# Patient Record
Sex: FEMALE | Race: WHITE | NOT HISPANIC OR LATINO | Employment: OTHER | URBAN - METROPOLITAN AREA
[De-identification: names, ages, dates, MRNs, and addresses within clinical notes are randomized per-mention and may not be internally consistent; named-entity substitution may affect disease eponyms.]

---

## 2017-01-14 DIAGNOSIS — E78.5 HYPERLIPIDEMIA LDL GOAL <160: Primary | ICD-10-CM

## 2017-06-13 ENCOUNTER — OFFICE VISIT (OUTPATIENT)
Dept: INTERNAL MEDICINE | Facility: CLINIC | Age: 62
End: 2017-06-13
Payer: COMMERCIAL

## 2017-06-13 VITALS
DIASTOLIC BLOOD PRESSURE: 68 MMHG | TEMPERATURE: 97.5 F | HEIGHT: 65 IN | HEART RATE: 84 BPM | OXYGEN SATURATION: 100 % | BODY MASS INDEX: 26.99 KG/M2 | WEIGHT: 162 LBS | SYSTOLIC BLOOD PRESSURE: 130 MMHG

## 2017-06-13 DIAGNOSIS — Z00.00 ENCOUNTER FOR ROUTINE ADULT HEALTH EXAMINATION WITHOUT ABNORMAL FINDINGS: Primary | ICD-10-CM

## 2017-06-13 DIAGNOSIS — Z12.39 SCREENING BREAST EXAMINATION: ICD-10-CM

## 2017-06-13 DIAGNOSIS — E78.5 HYPERLIPIDEMIA LDL GOAL <160: ICD-10-CM

## 2017-06-13 DIAGNOSIS — E03.9 HYPOTHYROIDISM, UNSPECIFIED TYPE: ICD-10-CM

## 2017-06-13 LAB
ALBUMIN SERPL-MCNC: 4.1 G/DL (ref 3.4–5)
ALP SERPL-CCNC: 58 U/L (ref 40–150)
ALT SERPL W P-5'-P-CCNC: 26 U/L (ref 0–50)
ANION GAP SERPL CALCULATED.3IONS-SCNC: 4 MMOL/L (ref 3–14)
AST SERPL W P-5'-P-CCNC: 31 U/L (ref 0–45)
BILIRUB SERPL-MCNC: 0.6 MG/DL (ref 0.2–1.3)
BUN SERPL-MCNC: 19 MG/DL (ref 7–30)
CALCIUM SERPL-MCNC: 9.4 MG/DL (ref 8.5–10.1)
CHLORIDE SERPL-SCNC: 107 MMOL/L (ref 94–109)
CHOLEST SERPL-MCNC: 268 MG/DL
CO2 SERPL-SCNC: 28 MMOL/L (ref 20–32)
CREAT SERPL-MCNC: 1.2 MG/DL (ref 0.52–1.04)
ERYTHROCYTE [DISTWIDTH] IN BLOOD BY AUTOMATED COUNT: 12.9 % (ref 10–15)
GFR SERPL CREATININE-BSD FRML MDRD: 45 ML/MIN/1.7M2
GLUCOSE SERPL-MCNC: 90 MG/DL (ref 70–99)
HCT VFR BLD AUTO: 44.7 % (ref 35–47)
HDLC SERPL-MCNC: 81 MG/DL
HGB BLD-MCNC: 14.5 G/DL (ref 11.7–15.7)
LDLC SERPL CALC-MCNC: 170 MG/DL
MCH RBC QN AUTO: 30.5 PG (ref 26.5–33)
MCHC RBC AUTO-ENTMCNC: 32.4 G/DL (ref 31.5–36.5)
MCV RBC AUTO: 94 FL (ref 78–100)
NONHDLC SERPL-MCNC: 187 MG/DL
PLATELET # BLD AUTO: 277 10E9/L (ref 150–450)
POTASSIUM SERPL-SCNC: 4.2 MMOL/L (ref 3.4–5.3)
PROT SERPL-MCNC: 7.1 G/DL (ref 6.8–8.8)
RBC # BLD AUTO: 4.76 10E12/L (ref 3.8–5.2)
SODIUM SERPL-SCNC: 139 MMOL/L (ref 133–144)
TRIGL SERPL-MCNC: 85 MG/DL
TSH SERPL DL<=0.005 MIU/L-ACNC: 0.97 MU/L (ref 0.4–4)
WBC # BLD AUTO: 4.7 10E9/L (ref 4–11)

## 2017-06-13 PROCEDURE — 80053 COMPREHEN METABOLIC PANEL: CPT | Performed by: INTERNAL MEDICINE

## 2017-06-13 PROCEDURE — 85027 COMPLETE CBC AUTOMATED: CPT | Performed by: INTERNAL MEDICINE

## 2017-06-13 PROCEDURE — 99396 PREV VISIT EST AGE 40-64: CPT | Performed by: INTERNAL MEDICINE

## 2017-06-13 PROCEDURE — 84443 ASSAY THYROID STIM HORMONE: CPT | Performed by: INTERNAL MEDICINE

## 2017-06-13 PROCEDURE — 36415 COLL VENOUS BLD VENIPUNCTURE: CPT | Performed by: INTERNAL MEDICINE

## 2017-06-13 PROCEDURE — 80061 LIPID PANEL: CPT | Performed by: INTERNAL MEDICINE

## 2017-06-13 RX ORDER — LEVOTHYROXINE SODIUM 75 UG/1
75 TABLET ORAL DAILY
Qty: 90 TABLET | Refills: 3 | Status: CANCELLED | OUTPATIENT
Start: 2017-06-13

## 2017-06-13 NOTE — MR AVS SNAPSHOT
After Visit Summary   6/13/2017    Kiki Ibrahim    MRN: [id number]           Patient Information     Date Of Birth          1955        Visit Information        Provider Department      6/13/2017 8:00 AM Roberto Montoya MD [facility]        Today's Diagnoses     Encounter for routine adult health examination without abnormal findings    -  1    Hypothyroidism, unspecified type        Hyperlipidemia LDL goal <160        Screening breast examination          Care Instructions      Preventive Health Recommendations  Female Ages 50 - 64    Yearly exam: See your health care provider every year in order to  o Review health changes.   o Discuss preventive care.    o Review your medicines if your doctor has prescribed any.      Get a Pap test every three years (unless you have an abnormal result and your provider advises testing more often).    If you get Pap tests with HPV test, you only need to test every 5 years, unless you have an abnormal result.     You do not need a Pap test if your uterus was removed (hysterectomy) and you have not had cancer.    You should be tested each year for STDs (sexually transmitted diseases) if you're at risk.     Have a mammogram every 1 to 2 years.    Have a colonoscopy at age 50, or have a yearly FIT test (stool test). These exams screen for colon cancer.      Have a cholesterol test every 5 years, or more often if advised.    Have a diabetes test (fasting glucose) every three years. If you are at risk for diabetes, you should have this test more often.     If you are at risk for osteoporosis (brittle bone disease), think about having a bone density scan (DEXA).    Shots: Get a flu shot each year. Get a tetanus shot every 10 years.    Nutrition:     Eat at least 5 servings of fruits and vegetables each day.    Eat whole-grain bread, whole-wheat pasta and brown rice instead of white grains and rice.    Talk to your provider about Calcium and  "Vitamin D.     Lifestyle    Exercise at least 150 minutes a week (30 minutes a day, 5 days a week). This will help you control your weight and prevent disease.    Limit alcohol to one drink per day.    No smoking.     Wear sunscreen to prevent skin cancer.     See your dentist every six months for an exam and cleaning.    See your eye doctor every 1 to 2 years.            Follow-ups after your visit        Future tests that were ordered for you today     Open Future Orders        Priority Expected Expires Ordered    *MA Screening Digital Bilateral Routine  6/13/2018 6/13/2017            Who to contact     If you have questions or need follow up information about today's clinic visit or your schedule please contact [facility] directly at [phone].  Normal or non-critical lab and imaging results will be communicated to you by [employer], letter or phone within 4 business days after the clinic has received the results. If you do not hear from us within 7 days, please contact the clinic through [employer] or phone. If you have a critical or abnormal lab result, we will notify you by phone as soon as possible.  Submit refill requests through [employer] or call your pharmacy and they will forward the refill request to us. Please allow 3 business days for your refill to be completed.          Additional Information About Your Visit        [employer] Information     [employer] lets you send messages to your doctor, view your test results, renew your prescriptions, schedule appointments and more. To sign up, go to [email or web address] . Click on \"Log in\" on the left side of the screen, which will take you to the Welcome page. Then click on \"Sign up Now\" on the right side of the page.     You will be asked to enter the access code listed below, as well as some personal information. Please follow the directions to create your username and password.     Your access code is: [email or web address]  Expires: 9/12/2017 " "10:36 PM     Your access code will  in 90 days. If you need help or a new code, please call your [facility] or [phone].        Care EveryWhere ID     This is your Care EveryWhere ID. This could be used by other organizations to access your [facility] medical records  [id number]        Your Vitals Were     Pulse Temperature Height Last Period Pulse Oximetry BMI (Body Mass Index)    84 97.5  F (36.4  C) (Oral) 5' 5\" (1.651 m) 2004 100% 26.96 kg/m2       Blood Pressure from Last 3 Encounters:   17 130/68   16 120/72   06/22/15 124/68    Weight from Last 3 Encounters:   17 162 lb (73.5 kg)   16 159 lb (72.1 kg)   06/22/15 158 lb (71.7 kg)              We Performed the Following     CBC with platelets     Comprehensive metabolic panel     Lipid panel reflex to direct LDL     TSH with free T4 reflex          Where to get your medicines      These medications were sent to [facility] [address] [address]     Phone:  [phone]     levothyroxine 75 MCG tablet          Primary Care Provider Office Phone # Fax #    Roberto Montoya [phone]       [facility] [address] [facility] [address]        Thank you!     Thank you for choosing [facility]  for your care. Our goal is always to provide you with excellent care. Hearing back from our patients is one way we can continue to improve our services. Please take a few minutes to complete the written survey that you may receive in the mail after your visit with us. Thank you!             Your Updated Medication List - Protect others around you: Learn how to safely use, store and throw away your medicines at www.disposemymeds.org.          This list is accurate as of: 17 11:59 PM.  Always use your most recent med list.                   Brand Name Dispense Instructions for use    levothyroxine 75 " MCG tablet    SYNTHROID/LEVOTHROID    90 tablet    Take 1 tablet (75 mcg) by mouth daily       MULTIVITAMIN TABS   OR      1 tablet daily       vitamin D 2000 UNITS tablet     100 tablet    Take 2,000 Units by mouth daily.

## 2017-06-13 NOTE — PROGRESS NOTES
SUBJECTIVE:     CC: Kiki Ibrahim is an 62 year old woman who presents for preventive health visit.     Healthy Habits:    Do you get at least three servings of calcium containing foods daily (dairy, green leafy vegetables, etc.)? yes    Amount of exercise or daily activities, outside of work: 5 day(s) per week with walking    Problems taking medications regularly No    Medication side effects: No    Have you had an eye exam in the past two years? yes    Do you see a dentist twice per year? yes    Do you have sleep apnea, excessive snoring or daytime drowsiness?yes        Today's PHQ-2 Score:   PHQ-2 ( 1999 Pf[employer]) 6/13/2017 6/23/2016   Q1: Little interest or pleasure in doing things 0 0   Q2: Feeling down, depressed or hopeless 0 0   PHQ-2 Score 0 0       Abuse: Current or Past(Physical, Sexual or Emotional)- No  Do you feel safe in your environment - Yes    Social History   Substance Use Topics     Smoking status: Never Smoker     Smokeless tobacco: Never Used     Alcohol use Yes      Comment: occasional     The patient does not drink >3 drinks per day nor >7 drinks per week.    Recent Labs   Lab Test  06/23/16   0739  06/22/15   0857  06/16/14   0918   CHOL  277*  242*  254*   HDL  90  82  75   LDL  172*  145*  150*   TRIG  73  74  146   CHOLHDLRATIO   --   3.0  3.4   NHDL  187*   --    --        Reviewed orders with patient.  Reviewed health maintenance and updated orders accordingly - Yes    Mammo Decision Support:  Patient over age 50, mutual decision to screen reflected in health maintenance.    Pertinent mammograms are reviewed under the imaging tab.  History of abnormal Pap smear: NO - age 30-65 PAP every 5 years with negative HPV co-testing recommended    Reviewed and updated as needed this visit by clinical staff  Tobacco  Allergies         Reviewed and updated as needed this visit by Provider          Colonoscopy Nov 2015. Repeat 5 years  Eye exam April 2016  DEXA July 2016. Repeat 5  "years    ROS:  C: NEGATIVE for fever, chills. Weight up 2 pounds  I: NEGATIVE for worrisome rashes, moles or lesions  E: NEGATIVE for vision changes or irritation. HAs glasses. Eye exam last year  ENT: NEGATIVE for ear, mouth and throat problems  R: NEGATIVE for significant cough or SOB  B: NEGATIVE for masses, tenderness or discharge  CV: NEGATIVE for chest pain, palpitations or peripheral edema  GI: NEGATIVE for nausea, abdominal pain, heartburn, or change in bowel habits  : NEGATIVE for unusual urinary or vaginal symptoms. No vaginal bleeding. Pap UTD  M: NEGATIVE for significant arthralgias or myalgia  N: NEGATIVE for weakness, dizziness or paresthesias  P: NEGATIVE for changes in mood or affect   E: POSITIVE for elevated lipids and thyroid replacement. Due for labs. DEXA UTD    Problem list, Medication list, Allergies, and Medical/Social/Surgical histories [facility] and updated as appropriate.  OBJECTIVE:     /68  Pulse 84  Temp 97.5  F (36.4  C) (Oral)  Ht 5' 5\" (1.651 m)  Wt 162 lb (73.5 kg)  LMP 08/24/2004  SpO2 100%  BMI 26.96 kg/m2  EXAM:  General appearance - healthy, alert, no distress  Skin - No rashes or lesions.  Head - normocephalic, atraumatic  Eyes - TIM, EOMI, fundi exam with nondilated pupils negative.  Ears - External ears normal. Canals clear. TM's normal.  Nose/Sinuses - Nares normal. Septum midline. Mucosa normal. No drainage or sinus tenderness.  Oropharynx - No erythema, no adenopathy, no exudates.  Neck - Supple without adenopathy or thyromegaly. No bruits.  Lungs - Clear to auscultation without wheezes/rhonchi.  Heart - Regular rate and rhythm without murmurs, clicks, or gallops.  Nodes - No supraclavicular, axillary, or inguinal adenopathy palpable.  Breasts - deferred  Abdomen - Abdomen soft, non-tender. BS normal. No masses or hepatosplenomegaly palpable. No bruits.  Extremities -No cyanosis, clubbing or edema.    Musculoskeletal - Spine ROM normal. Muscular " "strength intact.   Peripheral pulses - radial=4/4, femoral=4/4, posterior tibial=4/4, dorsalis pedis=4/4,  Neuro - Gait normal. Reflexes normal and symmetric. Sensation grossly WNL.  Genital/Rectal - deferred      ASSESSMENT/PLAN:     1. Encounter for routine adult health examination without abnormal findings  Labs as ordered. Mammogram  This summer. Other HCM UTD  - CBC with platelets  - Comprehensive metabolic panel    2. Hypothyroidism, unspecified type   lab as ordered. Continue current medication  - TSH with free T4 reflex  - levothyroxine (SYNTHROID/LEVOTHROID) 75 MCG tablet; Take 1 tablet (75 mcg) by mouth daily  Dispense: 90 tablet; Refill: 3    3. Hyperlipidemia LDL goal <160   lab as ordered. Previous coronary disease risk factor calculation below that to start statin therapy with previous lipid values. Will await new numbers  - Lipid panel reflex to direct LDL    4. Screening breast examination  Mammogram after 7/12/17  - *MA Screening Digital Bilateral; Future      COUNSELING:   Reviewed preventive health counseling, as reflected in patient instructions         reports that she has never smoked. She has never used smokeless tobacco.    Estimated body mass index is 26.96 kg/(m^2) as calculated from the following:    Height as of this encounter: 5' 5\" (1.651 m).    Weight as of this encounter: 162 lb (73.5 kg).   Weight management plan: Discussed healthy diet and exercise guidelines and patient will follow up in 12 months in clinic to re-evaluate.    Counseling Resources:  ATP IV Guidelines  Pooled Cohorts Equation Calculator  Breast Cancer Risk Calculator  FRAX Risk Assessment  ICSI Preventive Guidelines  Dietary Guidelines for Americans, 2010  USDA's MyPlate  ASA Prophylaxis  Lung CA Screening    Roberto Montoya MD  [facility]    "

## 2017-06-13 NOTE — NURSING NOTE
"Chief Complaint   Patient presents with     Physical       Initial /68  Pulse 84  Temp 97.5  F (36.4  C) (Oral)  Ht 5' 5\" (1.651 m)  Wt 162 lb (73.5 kg)  LMP 08/24/2004  SpO2 100%  BMI 26.96 kg/m2 Estimated body mass index is 26.96 kg/(m^2) as calculated from the following:    Height as of this encounter: 5' 5\" (1.651 m).    Weight as of this encounter: 162 lb (73.5 kg).  Medication Reconciliation: complete  "

## 2017-06-14 RX ORDER — LEVOTHYROXINE SODIUM 75 UG/1
75 TABLET ORAL DAILY
Qty: 90 TABLET | Refills: 3 | Status: SHIPPED | OUTPATIENT
Start: 2017-06-14 | End: 2018-04-15

## 2017-07-05 ENCOUNTER — TELEPHONE (OUTPATIENT)
Dept: INTERNAL MEDICINE | Facility: CLINIC | Age: 62
End: 2017-07-05

## 2017-07-05 NOTE — TELEPHONE ENCOUNTER
Reason for Call:  Request for results:    Name of test or procedure: lab    Date of test of procedure: 6/23/17    Location of the test or procedure: [facility] to leave the result message on voice mail or with a family member? YES    Phone number Patient can be reached at:  Home number on file [phone] (home)    Additional comments:     Call taken on 7/5/2017 at 10:17 AM by PATRICIA NICHOLS

## 2017-07-12 ENCOUNTER — RADIANT APPOINTMENT (OUTPATIENT)
Dept: MAMMOGRAPHY | Facility: CLINIC | Age: 62
End: 2017-07-12
Attending: INTERNAL MEDICINE
Payer: COMMERCIAL

## 2017-07-12 DIAGNOSIS — Z12.31 VISIT FOR SCREENING MAMMOGRAM: ICD-10-CM

## 2017-07-12 PROCEDURE — G0202 SCR MAMMO BI INCL CAD: HCPCS | Mod: TC

## 2017-07-12 NOTE — TELEPHONE ENCOUNTER
Patient walked into the clinic and would like to hear her lab results asap pls, lab test were done on 06.13.17.  She states she never even heard about her last year lab results either.  It is okay to leave a v/m message if patient doesn't answer the phone.

## 2017-08-11 NOTE — TELEPHONE ENCOUNTER
Pt is calling for these lab results again . Message marked done . please advise RN so we can advise pt .  Sodium 133 - 144 mmol/L 139   Potassium 3.4 - 5.3 mmol/L 4.2   Chloride 94 - 109 mmol/L 107   Carbon Dioxide 20 - 32 mmol/L 28   Anion Gap 3 - 14 mmol/L 4   Glucose 70 - 99 mg/dL 90   Urea Nitrogen 7 - 30 mg/dL 19   Creatinine 0.52 - 1.04 mg/dL 1.20 (H)   GFR Estimate >60 mL/min/1.7m2 45 (L)   GFR Estimate If Black >60 mL/min/1.7m2 55 (L)   Calcium 8.5 - 10.1 mg/dL 9.4   Bilirubin Total 0.2 - 1.3 mg/dL 0.6   Albumin 3.4 - 5.0 g/dL 4.1   Protein Total 6.8 - 8.8 g/dL 7.1   Alkaline Phosphatase 40 - 150 U/L 58   ALT 0 - 50 U/L 26   AST 0 - 45 U/L 31   View Full Report              CBC with platelets   Order: [id number]   Collected:  6/13/2017  8:44 AM      Ref Range & Units 1mo ago     WBC 4.0 - 11.0 10e9/L 4.7   RBC Count 3.8 - 5.2 10e12/L 4.76   Hemoglobin 11.7 - 15.7 g/dL 14.5   Hematocrit 35.0 - 47.0 % 44.7   MCV 78 - 100 fl 94   MCH 26.5 - 33.0 pg 30.5   MCHC 31.5 - 36.5 g/dL 32.4   RDW 10.0 - 15.0 % 12.9   Platelet Count 150 - 450 10e9/L 277   View Full Report                 Lipid panel reflex to direct LDL   Order: [id number]   Collected:  6/13/2017  8:44 AM      Ref Range & Units 1mo ago     Cholesterol <200 mg/dL 268 (H)   Comments: Desirable:       <200 mg/dl   Triglycerides <150 mg/dL 85   HDL Cholesterol >49 mg/dL 81   LDL Cholesterol Calculated <100 mg/dL 170 (H)   Comments: Above desirable:  100-129 mg/dl    Borderline High:  130-159 mg/dL    High:             160-189 mg/dL    Very high:       >189 mg/dl      Non HDL Cholesterol <130 mg/dL 187 (H)   Comments: Above Desirable:  130-159 mg/dl    Borderline high:  160-189 mg/dl    High:             190-219 mg/dl    Very high:       >219 mg/dl      View Full Report              TSH with free T4 reflex   Order: [id number]   Collected:  6/13/2017  8:44 AM      Ref Range & Units 1mo ago     TSH 0.40 - 4.00 mU/L 0.97   View Full Report        Cydney  Roni CRUZ

## 2018-01-04 DIAGNOSIS — Z13.1 SCREENING FOR DIABETES MELLITUS: ICD-10-CM

## 2018-01-04 DIAGNOSIS — Z13.6 CARDIOVASCULAR SCREENING; LDL GOAL LESS THAN 130: ICD-10-CM

## 2018-01-04 DIAGNOSIS — E03.9 HYPOTHYROIDISM, UNSPECIFIED TYPE: Primary | ICD-10-CM

## 2018-04-15 DIAGNOSIS — E03.9 HYPOTHYROIDISM, UNSPECIFIED TYPE: ICD-10-CM

## 2018-04-17 RX ORDER — LEVOTHYROXINE SODIUM 75 UG/1
TABLET ORAL
Qty: 90 TABLET | Refills: 0 | Status: SHIPPED | OUTPATIENT
Start: 2018-04-17 | End: 2018-07-13

## 2018-04-17 NOTE — TELEPHONE ENCOUNTER
"Last Written Prescription Date:  6/14/17  Last Fill Quantity: 90,  # refills: 3   Last office visit: 6/13/2017 with prescribing provider:     Future Office Visit:    Requested Prescriptions   Pending Prescriptions Disp Refills     levothyroxine (SYNTHROID/LEVOTHROID) 75 MCG tablet [Pharmacy Med Name: L-THYROXINE (SYNTHROID) TABS 75MCG] 90 tablet 3     Sig: TAKE 1 TABLET DAILY    Thyroid Protocol Passed    4/15/2018  4:53 AM       Passed - Patient is 12 years or older       Passed - Recent (12 mo) or future (30 days) visit within the authorizing provider's specialty    Patient had office visit in the last 12 months or has a visit in the next 30 days with authorizing provider or within the authorizing provider's specialty.  See \"Patient Info\" tab in inbasket, or \"Choose Columns\" in Meds & Orders section of the refill encounter.           Passed - Normal TSH on file in past 12 months    Recent Labs   Lab Test  06/13/17   0844   TSH  0.97             Passed - No active pregnancy on record    If patient is pregnant or has had a positive pregnancy test, please check TSH.         Passed - No positive pregnancy test in past 12 months    If patient is pregnant or has had a positive pregnancy test, please check TSH.            "

## 2018-04-17 NOTE — TELEPHONE ENCOUNTER
"Requested Prescriptions   Pending Prescriptions Disp Refills     levothyroxine (SYNTHROID/LEVOTHROID) 75 MCG tablet [Pharmacy Med Name: L-THYROXINE (SYNTHROID) TABS 75MCG]  Last Written Prescription Date:  6/14/2017  Last Fill Quantity: 90,  # refills: 3   Last office visit: 6/13/2017 with prescribing provider:  6/13/2017   Future Office Visit:     90 tablet 3     Sig: TAKE 1 TABLET DAILY    Thyroid Protocol Passed    4/15/2018  4:53 AM       Passed - Patient is 12 years or older       Passed - Recent (12 mo) or future (30 days) visit within the authorizing provider's specialty    Patient had office visit in the last 12 months or has a visit in the next 30 days with authorizing provider or within the authorizing provider's specialty.  See \"Patient Info\" tab in inbasket, or \"Choose Columns\" in Meds & Orders section of the refill encounter.           Passed - Normal TSH on file in past 12 months    Recent Labs   Lab Test  06/13/17   0844   TSH  0.97             Passed - No active pregnancy on record    If patient is pregnant or has had a positive pregnancy test, please check TSH.         Passed - No positive pregnancy test in past 12 months    If patient is pregnant or has had a positive pregnancy test, please check TSH.            "

## 2018-07-13 DIAGNOSIS — E03.9 HYPOTHYROIDISM, UNSPECIFIED TYPE: ICD-10-CM

## 2018-07-13 NOTE — LETTER
[facility]  [address] [id number] [phone]            Kiki Ibrahim  [address] [facility] [address]        July 16, 2018    Dear Kiki,    While refilling your prescription today, we noticed that you are due to have labs drawn and see your provider.  We will refill your prescription for 30 days, but a follow-up appointment must be made before any additional refills can be approved.     Taking care of your health is important to us and we look forward to seeing you in the near future.  Please call us at [phone] or [phone] (or use [employer]) to schedule an appointment.     Please disregard this notice if you have already made an appointment.    Sincerely,        [facility]

## 2018-07-14 NOTE — TELEPHONE ENCOUNTER
"Requested Prescriptions   Pending Prescriptions Disp Refills     levothyroxine (SYNTHROID/LEVOTHROID) 75 MCG tablet [Pharmacy Med Name: L-THYROXINE (SYNTHROID) TABS 75MCG]  Last Written Prescription Date:  04/17/2018  Last Fill Quantity: 90 tablet,  # refills: 0   Last office visit: 6/13/2017 with prescribing provider:  Roberto Montoya MD   Future Office Visit:     90 tablet 0     Sig: TAKE 1 TABLET DAILY    Thyroid Protocol Failed    7/13/2018 11:49 PM       Failed - Recent (12 mo) or future (30 days) visit within the authorizing provider's specialty    Patient had office visit in the last 12 months or has a visit in the next 30 days with authorizing provider or within the authorizing provider's specialty.  See \"Patient Info\" tab in inbasket, or \"Choose Columns\" in Meds & Orders section of the refill encounter.           Failed - Normal TSH on file in past 12 months    Recent Labs   Lab Test  06/13/17   0844   TSH  0.97             Passed - Patient is 12 years or older       Passed - No active pregnancy on record    If patient is pregnant or has had a positive pregnancy test, please check TSH.         Passed - No positive pregnancy test in past 12 months    If patient is pregnant or has had a positive pregnancy test, please check TSH.            "

## 2018-07-16 RX ORDER — LEVOTHYROXINE SODIUM 75 UG/1
TABLET ORAL
Qty: 90 TABLET | Refills: 0 | Status: SHIPPED | OUTPATIENT
Start: 2018-07-16 | End: 2018-07-16

## 2018-07-16 RX ORDER — LEVOTHYROXINE SODIUM 75 UG/1
75 TABLET ORAL DAILY
Qty: 90 TABLET | Refills: 0 | Status: SHIPPED | OUTPATIENT
Start: 2018-07-16 | End: 2018-09-21

## 2018-07-16 NOTE — TELEPHONE ENCOUNTER
Medication is being filled for 1 time refill only due to:  Patient needs labs and office visit .

## 2018-09-17 ENCOUNTER — HEALTH MAINTENANCE LETTER (OUTPATIENT)
Age: 63
End: 2018-09-17

## 2018-09-20 NOTE — PROGRESS NOTES
SUBJECTIVE:   CC: Kiki Ibrahim is an 63 year old woman who presents for preventive health visit.     Healthy Habits:  Answers for HPI/ROS submitted by the patient on 9/21/2018   Annual Exam:  Getting at least 3 servings of Calcium per day:: Yes  Bi-annual eye exam:: Yes  Dental care twice a year:: Yes  Sleep apnea or symptoms of sleep apnea:: None  Diet:: Regular (no restrictions)  Frequency of exercise:: 4-5 days/week  Taking medications regularly:: Yes  Medication side effects:: None  PHQ-2 Score: 0  Duration of exercise:: 30-45 minutes          Today's PHQ-2 Score:   PHQ-2 ( 1999 Pfizer) 6/13/2017 6/23/2016   Q1: Little interest or pleasure in doing things 0 0   Q2: Feeling down, depressed or hopeless 0 0   PHQ-2 Score 0 0       Abuse: Current or Past(Physical, Sexual or Emotional)- NA  Do you feel safe in your environment - YES    Social History   Substance Use Topics     Smoking status: Never Smoker     Smokeless tobacco: Never Used     Alcohol use Yes      Comment: occasional     If you drink alcohol do you typically have >3 drinks per day or >7 drinks per week? No                     Reviewed orders with patient.  Reviewed health maintenance and updated orders accordingly - Yes  Labs reviewed in [facility]    Patient over age 50, mutual decision to screen reflected in health maintenance.    Pertinent mammograms are reviewed under the imaging tab.  History of abnormal Pap smear: NO - age 30-65 PAP every 5 years with negative HPV co-testing recommended  PAP / HPV 6/16/2014 5/9/2011 5/7/2010   PAP NIL NIL NIL     Reviewed and updated as needed this visit by clinical staff         Reviewed and updated as needed this visit by Provider            ROS:  CONSTITUTIONAL: NEGATIVE for fever, chills. Weight up 3 pounds  INTEGUMENTARY/SKIN: NEGATIVE for worrisome rashes, moles or lesions  EYES: NEGATIVE for vision changes or irritation  ENT: NEGATIVE for ear, mouth and throat problems  RESP: NEGATIVE for significant  "cough or SOB  BREAST: NEGATIVE for masses, tenderness or discharge  CV: NEGATIVE for chest pain, palpitations or peripheral edema  GI: NEGATIVE for nausea, abdominal pain, heartburn, or change in bowel habits  : NEGATIVE for unusual urinary or vaginal symptoms. No vaginal bleeding.  MUSCULOSKELETAL: NEGATIVE for significant arthralgias or myalgia  NEURO: NEGATIVE for weakness, dizziness or paresthesias  PSYCHIATRIC: NEGATIVE for changes in mood or affect   ENDO: hx elevated lipids.     OBJECTIVE:   /66  Pulse 87  Temp 98.6  F (37  C) (Oral)  Resp 16  Ht 5' 5\" (1.651 m)  Wt 165 lb (74.8 kg)  LMP 08/24/2004  SpO2 98%  BMI 27.46 kg/m2  EXAM:  General appearance - healthy, alert, no distress  Skin - No rashes or lesions. Few seborrheic keratosis on back  Head - normocephalic, atraumatic  Eyes - TIM, EOMI, fundi exam with nondilated pupils negative.  Ears - External ears normal. Canals clear. TM's normal.  Nose/Sinuses - Nares normal. Septum midline. Mucosa normal. No drainage or sinus tenderness.  Oropharynx - No erythema, no adenopathy, no exudates.  Neck - Supple without adenopathy or thyromegaly. No bruits.  Lungs - Clear to auscultation without wheezes/rhonchi.  Heart - Regular rate and rhythm without murmurs, clicks, or gallops.  Nodes - No supraclavicular, axillary, or inguinal adenopathy palpable.  Breasts - deferred  Abdomen - Abdomen soft, non-tender. BS normal. No masses or hepatosplenomegaly palpable. No bruits.  Extremities -No cyanosis, clubbing or edema.    Musculoskeletal - Spine ROM normal. Muscular strength intact.   Peripheral pulses - radial=4/4, femoral=4/4, posterior tibial=4/4, dorsalis pedis=4/4,  Neuro - Gait normal. Reflexes normal and symmetric. Sensation grossly WNL.  Genital/Rectal - deferred       ASSESSMENT/PLAN:   1. Encounter for routine adult health examination without abnormal findings  See below for healthcare maintenance    2. Hypothyroidism, unspecified " "type  Controlled.  Continue current medication.  Lab as ordered  - TSH with free T4 reflex  - levothyroxine (SYNTHROID/LEVOTHROID) 75 MCG tablet; Take 1 tablet (75 mcg) by mouth daily  Dispense: 90 tablet; Refill: 3    3. Need for prophylactic vaccination and inoculation against influenza  Flu shot recommended.  Patient declines at this time    4. Visit for screening mammogram  - MA SCREENING DIGITAL BILAT - Future  (s+30); Future    5. Screening for HIV (human immunodeficiency virus)   HIV screening recommendation per USPSTF guideline discussed with pt. Pt declines.     6. CARDIOVASCULAR SCREENING; LDL GOAL LESS THAN 130  - Lipid panel reflex to direct LDL Fasting    7. Screening for diabetes mellitus  - Basic metabolic panel      COUNSELING:   Reviewed preventive health counseling, as reflected in patient instructions    BP Readings from Last 1 Encounters:   06/13/17 130/68     Estimated body mass index is 26.96 kg/(m^2) as calculated from the following:    Height as of 6/13/17: 5' 5\" (1.651 m).    Weight as of 6/13/17: 162 lb (73.5 kg).    BP Screening:   Last 3 BP Readings:    BP Readings from Last 3 Encounters:   09/21/18 128/66   06/13/17 130/68   06/23/16 120/72       The following was recommended to the patient:  Re-screen BP within a year and recommended lifestyle modifications  Weight management plan: Discussed healthy diet and exercise guidelines and patient will follow up in 12 months in clinic to re-evaluate.     reports that she has never smoked. She has never used smokeless tobacco.      Counseling Resources:  ATP IV Guidelines  Pooled Cohorts Equation Calculator  Breast Cancer Risk Calculator  FRAX Risk Assessment  ICSI Preventive Guidelines  Dietary Guidelines for Americans, 2010  USDA's MyPlate  ASA Prophylaxis  Lung CA Screening      PLAN:  Continue current meds  Prescriptions refilled.    Labs today as ordered  Mammogram   Vaccinations:  recommend flu vaccine   Check with insurance or speak with " your pharmacist re: Shingrix vaccine coverage for shingles prevention.  This is a 2 shot series done 2-6 months apart  Calorie/carbohydrate (sugar, bread, potato, pasta, etc) reduction in diet.   Increase color on your plate with fruits and vegetables.  Continue frequency of walking or other aerobic exercise                 Roberto Montoya MD  [facility]

## 2018-09-20 NOTE — PATIENT INSTRUCTIONS
Continue current meds  Prescriptions refilled.    Labs today as ordered  Mammogram   Vaccinations:  recommend flu vaccine   Check with insurance or speak with your pharmacist re: Shingrix vaccine coverage for shingles prevention.  This is a 2 shot series done 2-6 months apart  Calorie/carbohydrate (sugar, bread, potato, pasta, etc) reduction in diet.   Increase color on your plate with fruits and vegetables.  Continue frequency of walking or other aerobic exercise

## 2018-09-21 ENCOUNTER — OFFICE VISIT (OUTPATIENT)
Dept: INTERNAL MEDICINE | Facility: CLINIC | Age: 63
End: 2018-09-21
Payer: COMMERCIAL

## 2018-09-21 VITALS
OXYGEN SATURATION: 98 % | HEART RATE: 87 BPM | WEIGHT: 165 LBS | HEIGHT: 65 IN | TEMPERATURE: 98.6 F | SYSTOLIC BLOOD PRESSURE: 128 MMHG | DIASTOLIC BLOOD PRESSURE: 66 MMHG | BODY MASS INDEX: 27.49 KG/M2 | RESPIRATION RATE: 16 BRPM

## 2018-09-21 DIAGNOSIS — Z11.4 SCREENING FOR HIV (HUMAN IMMUNODEFICIENCY VIRUS): ICD-10-CM

## 2018-09-21 DIAGNOSIS — Z12.31 VISIT FOR SCREENING MAMMOGRAM: ICD-10-CM

## 2018-09-21 DIAGNOSIS — Z13.6 CARDIOVASCULAR SCREENING; LDL GOAL LESS THAN 130: ICD-10-CM

## 2018-09-21 DIAGNOSIS — E03.9 HYPOTHYROIDISM, UNSPECIFIED TYPE: ICD-10-CM

## 2018-09-21 DIAGNOSIS — Z00.00 ENCOUNTER FOR ROUTINE ADULT HEALTH EXAMINATION WITHOUT ABNORMAL FINDINGS: Primary | ICD-10-CM

## 2018-09-21 DIAGNOSIS — Z13.1 SCREENING FOR DIABETES MELLITUS: ICD-10-CM

## 2018-09-21 DIAGNOSIS — Z23 NEED FOR PROPHYLACTIC VACCINATION AND INOCULATION AGAINST INFLUENZA: ICD-10-CM

## 2018-09-21 LAB
ANION GAP SERPL CALCULATED.3IONS-SCNC: 6 MMOL/L (ref 3–14)
BUN SERPL-MCNC: 21 MG/DL (ref 7–30)
CALCIUM SERPL-MCNC: 9.1 MG/DL (ref 8.5–10.1)
CHLORIDE SERPL-SCNC: 104 MMOL/L (ref 94–109)
CHOLEST SERPL-MCNC: 275 MG/DL
CO2 SERPL-SCNC: 28 MMOL/L (ref 20–32)
CREAT SERPL-MCNC: 1.01 MG/DL (ref 0.52–1.04)
GFR SERPL CREATININE-BSD FRML MDRD: 55 ML/MIN/1.7M2
GLUCOSE SERPL-MCNC: 90 MG/DL (ref 70–99)
HDLC SERPL-MCNC: 81 MG/DL
LDLC SERPL CALC-MCNC: 182 MG/DL
NONHDLC SERPL-MCNC: 194 MG/DL
POTASSIUM SERPL-SCNC: 4.1 MMOL/L (ref 3.4–5.3)
SODIUM SERPL-SCNC: 138 MMOL/L (ref 133–144)
TRIGL SERPL-MCNC: 60 MG/DL
TSH SERPL DL<=0.005 MIU/L-ACNC: 2.57 MU/L (ref 0.4–4)

## 2018-09-21 PROCEDURE — 84443 ASSAY THYROID STIM HORMONE: CPT | Performed by: INTERNAL MEDICINE

## 2018-09-21 PROCEDURE — 80061 LIPID PANEL: CPT | Performed by: INTERNAL MEDICINE

## 2018-09-21 PROCEDURE — 36415 COLL VENOUS BLD VENIPUNCTURE: CPT | Performed by: INTERNAL MEDICINE

## 2018-09-21 PROCEDURE — 80048 BASIC METABOLIC PNL TOTAL CA: CPT | Performed by: INTERNAL MEDICINE

## 2018-09-21 PROCEDURE — 99396 PREV VISIT EST AGE 40-64: CPT | Performed by: INTERNAL MEDICINE

## 2018-09-21 RX ORDER — LEVOTHYROXINE SODIUM 75 UG/1
75 TABLET ORAL DAILY
Qty: 90 TABLET | Refills: 3 | Status: SHIPPED | OUTPATIENT
Start: 2018-09-21 | End: 2019-10-23

## 2018-09-21 NOTE — MR AVS SNAPSHOT
After Visit Summary   9/21/2018    Kiki Ibrahim    MRN: [id number]           Patient Information     Date Of Birth          1955        Visit Information        Provider Department      9/21/2018 8:30 AM Roberto Montoya MD [facility]        Today's Diagnoses     Visit for screening mammogram        Screening for HIV (human immunodeficiency virus)        Need for prophylactic vaccination and inoculation against influenza        Hypothyroidism, unspecified type        CARDIOVASCULAR SCREENING; LDL GOAL LESS THAN 130        Screening for diabetes mellitus          Care Instructions    Continue current meds  Prescriptions refilled.    Labs today as ordered  Mammogram   Vaccinations:  recommend flu vaccine   Check with insurance or speak with your pharmacist re: Shingrix vaccine coverage for shingles prevention.  This is a 2 shot series done 2-6 months apart          Follow-ups after your visit        Future tests that were ordered for you today     Open Future Orders        Priority Expected Expires Ordered    MA SCREENING DIGITAL BILAT - Future  (s+30) Routine  9/20/2019 9/21/2018            Who to contact     If you have questions or need follow up information about today's clinic visit or your schedule please contact [facility] directly at [phone].  Normal or non-critical lab and imaging results will be communicated to you by MyChart, letter or phone within 4 business days after the clinic has received the results. If you do not hear from us within 7 days, please contact the clinic through MyChart or phone. If you have a critical or abnormal lab result, we will notify you by phone as soon as possible.  Submit refill requests through [employer] or call your pharmacy and they will forward the refill request to us. Please allow 3 business days for your refill to be completed.          Additional Information About Your Visit        MyChart  "Information     [facility] lets you send messages to your doctor, view your test results, renew your prescriptions, schedule appointments and more. To sign up, go to [email or web address] . Click on \"Log in\" on the left side of the screen, which will take you to the Welcome page. Then click on \"Sign up Now\" on the right side of the page.     You will be asked to enter the access code listed below, as well as some personal information. Please follow the directions to create your username and password.     Your access code is: [email or web address]  Expires: 2018  9:14 AM     Your access code will  in 90 days. If you need help or a new code, please call your [place] clinic or [phone].        Care EveryWhere ID     This is your [facility] EveryWhere ID. This could be used by other organizations to access your [place] medical records  [id number]        Your Vitals Were     Pulse Temperature Respirations Height Last Period Pulse Oximetry    87 98.6  F (37  C) (Oral) 16 5' 5\" (1.651 m) 2004 98%    BMI (Body Mass Index)                   27.46 kg/m2            Blood Pressure from Last 3 Encounters:   18 128/66   17 130/68   16 120/72    Weight from Last 3 Encounters:   18 165 lb (74.8 kg)   17 162 lb (73.5 kg)   16 159 lb (72.1 kg)              We Performed the Following     Basic metabolic panel     Lipid panel reflex to direct LDL Fasting     TSH with free T4 reflex          Where to get your medicines      These medications were sent to [facility], [place] [address] [facility]  [address] [facility] [address]     Phone:  [phone]     levothyroxine 75 MCG tablet          Primary Care Provider Office Phone # Fax #    Roberto Montoya [phone]       [address] [facility] [address]        Equal Access to Services     JULI RICHMOND AH: Adriana Valero, geraldo mckeon, qaybmatilda polanco" maria elena theodoresada abhishekjanak onealaan ah. So [facility] [phone].    ATENCIÓN: Si renanla alverto, tiene a claudio disposición servicios gratuitos de asistencia lingüística. Karin al [phone].    We comply with applicable federal civil rights laws and [place] laws. We do not discriminate on the basis of race, color, national origin, age, disability, sex, sexual orientation, or gender identity.            Thank you!     [facility] you [facility] choosing [facility]. Our goal is always to provide you with excellent care. Hearing back from our patients is one way we can continue to improve our services. Please take a few minutes to complete the written survey that you may receive in the mail after your visit with us. Thank you!             Your Updated Medication List - Protect others around you: Learn how to safely use, store and throw away your medicines at www.disposemymeds.org.          This list is accurate as of 9/21/18  9:14 AM.  Always use your most recent med list.                   Brand Name Dispense Instructions for use Diagnosis    levothyroxine 75 MCG tablet    SYNTHROID/LEVOTHROID    90 tablet    Take 1 tablet (75 mcg) by mouth daily    Hypothyroidism, unspecified type       MULTIVITAMIN TABS   OR      1 tablet daily        vitamin D 2000 units tablet     100 tablet    Take 2,000 Units by mouth daily.    Vitamin D deficiency disease

## 2018-09-28 ENCOUNTER — RADIANT APPOINTMENT (OUTPATIENT)
Dept: MAMMOGRAPHY | Facility: CLINIC | Age: 63
End: 2018-09-28
Attending: INTERNAL MEDICINE
Payer: COMMERCIAL

## 2018-09-28 DIAGNOSIS — Z12.31 VISIT FOR SCREENING MAMMOGRAM: ICD-10-CM

## 2018-09-28 PROCEDURE — 77067 SCR MAMMO BI INCL CAD: CPT | Mod: TC

## 2019-07-30 ENCOUNTER — ANCILLARY PROCEDURE (OUTPATIENT)
Dept: GENERAL RADIOLOGY | Facility: CLINIC | Age: 64
End: 2019-07-30
Attending: FAMILY MEDICINE
Payer: MEDICARE

## 2019-07-30 ENCOUNTER — OFFICE VISIT (OUTPATIENT)
Dept: URGENT CARE | Facility: URGENT CARE | Age: 64
End: 2019-07-30
Payer: MEDICARE

## 2019-07-30 VITALS
DIASTOLIC BLOOD PRESSURE: 80 MMHG | HEART RATE: 95 BPM | OXYGEN SATURATION: 99 % | BODY MASS INDEX: 22.73 KG/M2 | RESPIRATION RATE: 16 BRPM | WEIGHT: 136.6 LBS | TEMPERATURE: 98.1 F | SYSTOLIC BLOOD PRESSURE: 138 MMHG

## 2019-07-30 DIAGNOSIS — S01.81XA FACIAL LACERATION, INITIAL ENCOUNTER: ICD-10-CM

## 2019-07-30 DIAGNOSIS — S69.92XA HAND INJURY, LEFT, INITIAL ENCOUNTER: ICD-10-CM

## 2019-07-30 DIAGNOSIS — S09.90XA HEAD INJURY, INITIAL ENCOUNTER: Primary | ICD-10-CM

## 2019-07-30 PROCEDURE — 73130 X-RAY EXAM OF HAND: CPT | Mod: LT

## 2019-07-30 PROCEDURE — 12013 RPR F/E/E/N/L/M 2.6-5.0 CM: CPT | Performed by: FAMILY MEDICINE

## 2019-07-30 PROCEDURE — 99214 OFFICE O/P EST MOD 30 MIN: CPT | Mod: 25 | Performed by: FAMILY MEDICINE

## 2019-07-30 ASSESSMENT — PAIN SCALES - GENERAL: PAINLEVEL: MILD PAIN (2)

## 2019-07-30 NOTE — PATIENT INSTRUCTIONS
Patient Education     * Head Injury, no wake-up (Adult)    You have a head injury. It doesn t look serious right now. But symptoms of a more serious problem may appear later. This could be a mild brain injury (concussion), or bruising or bleeding in the brain. You or someone caring for you will need to watch for the symptoms listed below for at least the next 24 hours. When you get home, be sure to follow any care instructions you re given.  Home care  Watch for the following symptoms  Call 9-1-1 if you have any of these symptoms over the next hours or days:  1. Severe headache or headache that gets worse  2. Nausea and repeated throwing up (vomiting)  3. Dizziness or changes in eyesight (vision changes)  4. Bothered by bright light or loud noise  5. Sleep changes (trouble falling asleep or unusually sleepy or groggy)  6. Changes in the way you act or talk (personality or speech changes)  7. Feeling confused or forgetting things (memory loss)  8. Trouble walking or clumsiness  9. Passing out or fainting (even for a short time)  10. Won t wake up  11. Stiff neck  12. Weakness or numbness in any part of the body  13. Seizures  Do you have signs of a concussion?  A concussion is an injury to the brain caused by shaking. If you were knocked out, that s a sign you may have a concussion. But watch for these signs, too:    Upset stomach (nausea)    Throwing up (vomiting)    Feeling dizzy or confused    Headache    Loss of memory  If you have any of the above signs:    Don t return to sports or any activity where you might hurt your head again.    Wait until all symptoms have been gone for 2 full weeks, and your doctor has cleared you to return to sports.  You could get a serious brain injury if you get hurt again before fully recovering.  General care    You don t need to stay awake or be woken during the night.    For pain, you may use:  ? Tylenol (acetaminophen) 650 to 1000 mg every 6 hours OR  ? Motrin or Advil  (ibuprofen) 600 mg every 6 to 8 hours with food  NOTE: If you have chronic liver or kidney disease or ever had a stomach ulcer or GI bleeding, talk with your doctor before using these medicines.    Don t take aspirin after a head injury.    For swelling and to help with pain: Put a cold source to the injured area for up to 20 minutes at a time. Do this as often as directed. Use a cold pack or bag of ice wrapped in a thin towel. Never put a cold source directly on the skin.    For cuts and scrapes: Care for them as the doctor or nurse directed.    For the next 24 hours (or longer, if your doctor advises it):  ? Don t drink alcohol, use sedatives, or use other medicines that make you sleepy.  ? Don t drive or use large machines.  ? Don t do anything tiring, such as heavy lifting or straining.  ? Limit tasks where you need to focus or concentrate. This includes reading, using a smartphone or computer, watching TV and playing video games.  ? Don t return to sports or other activities that could cause another head injury.  Follow-up care  Follow up with your doctor if symptoms don t get better after 24 hours, or as directed.  When to call the doctor  Call your doctor right away if any of these occur:    Pain doesn t get better or gets worse    New or increased swelling or bruising    Fever of 100.4 F (38 C) or higher, or as directed by your doctor    Increased redness, warmth, draining or bleeding from the injury    Fluid drainage or bleeding from the nose or ears    Any pushed-in spot or bony bump in the injured area  Date Last Reviewed: 9/26/2015 2000-2018 [employer] [address] [place] [address], [place] [address]. All rights reserved. This information is not intended as a substitute for professional medical care. Always follow your healthcare professional's instructions.  This information has been modified by your health care provider with permission from the publisher.  Modifications clinically reviewed  by Natan Etienne DO, MBA, FACOEP, Director of Physician Informatics for Emergency Medicine, [facility] on 8/27/18.

## 2019-07-31 ENCOUNTER — HOSPITAL ENCOUNTER (OUTPATIENT)
Dept: CT IMAGING | Facility: CLINIC | Age: 64
Discharge: HOME | End: 2019-07-31
Attending: FAMILY MEDICINE | Admitting: FAMILY MEDICINE
Payer: MEDICARE

## 2019-07-31 DIAGNOSIS — S09.90XA HEAD INJURY, INITIAL ENCOUNTER: ICD-10-CM

## 2019-07-31 DIAGNOSIS — S01.81XA FACIAL LACERATION, INITIAL ENCOUNTER: ICD-10-CM

## 2019-07-31 PROCEDURE — 70450 CT HEAD/BRAIN W/O DYE: CPT

## 2019-08-05 ENCOUNTER — OFFICE VISIT (OUTPATIENT)
Dept: URGENT CARE | Facility: URGENT CARE | Age: 64
End: 2019-08-05
Payer: MEDICARE

## 2019-08-05 VITALS
OXYGEN SATURATION: 100 % | HEART RATE: 85 BPM | SYSTOLIC BLOOD PRESSURE: 132 MMHG | WEIGHT: 136 LBS | DIASTOLIC BLOOD PRESSURE: 80 MMHG | RESPIRATION RATE: 16 BRPM | BODY MASS INDEX: 22.63 KG/M2

## 2019-08-05 DIAGNOSIS — Z48.02 VISIT FOR SUTURE REMOVAL: Primary | ICD-10-CM

## 2019-08-05 PROCEDURE — 99207 ZZC NO CHARGE NURSE ONLY: CPT

## 2019-08-05 NOTE — PROGRESS NOTES
Suture removal:     Date sutures applied: 7/30/19         Where (setting) in which they applied:Urgent Care visit    Description:  Type: sutures  Location: R side of forehead    History:    Cause of laceration: Fall    Accompanying Signs & Symptoms: (staff: if yes-describe)  Redness: no  Warmth: no  Drainage: no  Still bleeding: no  Fevers: no    Last tetanus shot: last tetanus booster within 10 years

## 2019-08-26 NOTE — PROGRESS NOTES
SUBJECTIVE: @[email or web address] who presents to the clinic with a laceration on the face sustained hour(s) ago.    This is a non-work related and accidental injury.    Mechanism of injury: blunt trauma (contusion).  Associated symptoms: Denies numbness, weakness, or loss of function  Last tetanus booster within 10 years: yes    Past Medical History:   Diagnosis Date     Colon polyps  Nov 2015     Hyperlipidemia LDL goal <160       Lateral epicondylitis  of elbow     right     Malignant neoplasm of breast (female), unspecified site 5/06     Left breast. Infiltrating dictal carcinoma     OSTEOPENIA      Unspecified hypothyroidism      Vitamin D deficiency 3/20/2013     Allergies   Allergen Reactions     Fosamax      Constipation     Social History     Tobacco Use     Smoking status: Never Smoker     Smokeless tobacco: Never Used   Substance Use Topics     Alcohol use: Yes     Comment: occasional       ROS:  Neuro: good distal sensation  Motor: normal rom and strenght  Hem: capillary refill < 2 sec    EXAM: The patient appears today in alert,no apparent distress distressVITALS:   /80   Pulse 95   Temp 98.1  F (36.7  C) (Oral)   Resp 16   Wt 62 kg (136 lb 9.6 oz)   LMP 08/24/2004   SpO2 99%   BMI 22.73 kg/m    Size of laceration: 3 centimeters  Characteristics of the laceration: clean and straight  Tendon function intact: yes  Sensation to light touch intact: yes  Pulses/Capillary refill intact: yes    Study Result     CT SCAN OF THE HEAD WITHOUT CONTRAST  July 31, 2019 7:31 AM      HISTORY: Headache, post traumatic. Head injury, initial encounter.  Facial laceration, initial encounter.     TECHNIQUE: Axial images of the head and coronal reformations without  IV contrast material. Radiation dose for this scan was reduced using  automated exposure control, adjustment of the mA and/or kV according  to patient size, or iterative reconstruction technique.     COMPARISON: None.     FINDINGS: The ventricles are normal in  size, shape and configuration.  The brain parenchyma and subarachnoid spaces are normal. There is no  evidence of intracranial hemorrhage, mass, acute infarct or anomaly.      The visualized portions of the sinuses and mastoids appear normal.  There is no evidence of trauma.                                                                       IMPRESSION: Normal CT scan of the head.      SEPIDEH SEGURA MD     Xray without acute findings, no fx read by Luke Zaldivar D.O.      ICD-10-CM    1. Head injury, initial encounter S09.90XA CT Head w/o Contrast   2. Facial laceration, initial encounter S01.81XA REPAIR INTERMED, WOUND FACE/EARS 2.5-5.0 CM     CT Head w/o Contrast   3. Hand injury, left, initial encounter S69.92XA XR Hand Left G/E 3 Views       Procedure Note:Wound injected with 1 cc's of Lidocaine 1% with epinephrine  Good anesthesia was obtainedPrepped and draped in the usual sterile fashion  Wound cleaned with sterile water  Wound irrigatedLaceration was closed using 5 6-0 nylon interrupted sutures  After care instructions:Keep wound clean   Sutures out in 5 days  Signs of infection discussed today

## 2019-10-23 DIAGNOSIS — E03.9 HYPOTHYROIDISM, UNSPECIFIED TYPE: ICD-10-CM

## 2019-10-23 RX ORDER — LEVOTHYROXINE SODIUM 75 UG/1
TABLET ORAL
Qty: 30 TABLET | Refills: 0 | Status: SHIPPED | OUTPATIENT
Start: 2019-10-23 | End: 2019-11-14

## 2019-10-23 NOTE — LETTER
[facility]  [address] [id number] [phone]            Kiki Ibrahim  [address] [place] [address]        October 23, 2019    Dear Kiki,    While refilling your prescription today, we noticed that you are due for an appointment with your provider.  We will refill your prescription for 30 days, but a follow-up appointment must be made before any additional refills can be approved.     Taking care of your health is important to us and we look forward to seeing you in the near future.  Please call us at [phone] or [phone] (or use [employer] - Share Your Passion!) to schedule an appointment.     Please disregard this notice if you have already made an appointment.    Sincerely,        [facility]

## 2019-10-23 NOTE — TELEPHONE ENCOUNTER
"Requested Prescriptions   Pending Prescriptions Disp Refills     levothyroxine (SYNTHROID/LEVOTHROID) 75 MCG tablet [Pharmacy Med Name: L-THYROXINE (SYNTHROID) TABS 75MCG] 90 tablet 4     Sig: TAKE 1 TABLET DAILY       Thyroid Protocol Failed - 10/23/2019 12:07 AM        Failed - Recent (12 mo) or future (30 days) visit within the authorizing provider's specialty     Patient has had an office visit with the authorizing provider or a provider within the authorizing providers department within the previous 12 mos or has a future within next 30 days. See \"Patient Info\" tab in inbasket, or \"Choose Columns\" in Meds & Orders section of the refill encounter.              Failed - Normal TSH on file in past 12 months     Recent Labs   Lab Test 09/21/18  0920   TSH 2.57              Passed - Patient is 12 years or older        Passed - Medication is active on med list        Passed - No active pregnancy on record     If patient is pregnant or has had a positive pregnancy test, please check TSH.          Passed - No positive pregnancy test in past 12 months     If patient is pregnant or has had a positive pregnancy test, please check TSH.          Medication is being filled for 1 time refill only due to:  Patient needs to be seen because it has been more than one year since last visit.    "

## 2019-11-14 ENCOUNTER — ANCILLARY PROCEDURE (OUTPATIENT)
Dept: MAMMOGRAPHY | Facility: CLINIC | Age: 64
End: 2019-11-14
Attending: INTERNAL MEDICINE
Payer: MEDICARE

## 2019-11-14 ENCOUNTER — OFFICE VISIT (OUTPATIENT)
Dept: INTERNAL MEDICINE | Facility: CLINIC | Age: 64
End: 2019-11-14
Payer: MEDICARE

## 2019-11-14 VITALS
HEIGHT: 64 IN | HEART RATE: 89 BPM | BODY MASS INDEX: 28 KG/M2 | WEIGHT: 164 LBS | SYSTOLIC BLOOD PRESSURE: 124 MMHG | OXYGEN SATURATION: 98 % | TEMPERATURE: 98.2 F | RESPIRATION RATE: 16 BRPM | DIASTOLIC BLOOD PRESSURE: 74 MMHG

## 2019-11-14 DIAGNOSIS — Z23 NEED FOR PROPHYLACTIC VACCINATION AGAINST STREPTOCOCCUS PNEUMONIAE (PNEUMOCOCCUS): ICD-10-CM

## 2019-11-14 DIAGNOSIS — R94.4 ABNORMAL RENAL FUNCTION TEST: Primary | ICD-10-CM

## 2019-11-14 DIAGNOSIS — Z12.31 ENCOUNTER FOR SCREENING MAMMOGRAM FOR BREAST CANCER: ICD-10-CM

## 2019-11-14 DIAGNOSIS — E78.5 HYPERLIPIDEMIA LDL GOAL <100: ICD-10-CM

## 2019-11-14 DIAGNOSIS — Z12.31 VISIT FOR SCREENING MAMMOGRAM: ICD-10-CM

## 2019-11-14 DIAGNOSIS — E03.9 HYPOTHYROIDISM, UNSPECIFIED TYPE: ICD-10-CM

## 2019-11-14 DIAGNOSIS — Z13.1 SCREENING FOR DIABETES MELLITUS: ICD-10-CM

## 2019-11-14 DIAGNOSIS — Z00.00 MEDICARE ANNUAL WELLNESS VISIT, INITIAL: ICD-10-CM

## 2019-11-14 LAB
ALBUMIN SERPL-MCNC: 4 G/DL (ref 3.4–5)
ALP SERPL-CCNC: 88 U/L (ref 40–150)
ALT SERPL W P-5'-P-CCNC: 30 U/L (ref 0–50)
ANION GAP SERPL CALCULATED.3IONS-SCNC: 9 MMOL/L (ref 3–14)
AST SERPL W P-5'-P-CCNC: 19 U/L (ref 0–45)
BILIRUB SERPL-MCNC: 0.5 MG/DL (ref 0.2–1.3)
BUN SERPL-MCNC: 17 MG/DL (ref 7–30)
CALCIUM SERPL-MCNC: 9.7 MG/DL (ref 8.5–10.1)
CHLORIDE SERPL-SCNC: 104 MMOL/L (ref 94–109)
CO2 SERPL-SCNC: 26 MMOL/L (ref 20–32)
CREAT SERPL-MCNC: 1.05 MG/DL (ref 0.52–1.04)
GFR SERPL CREATININE-BSD FRML MDRD: 55 ML/MIN/{1.73_M2}
GLUCOSE SERPL-MCNC: 93 MG/DL (ref 70–99)
POTASSIUM SERPL-SCNC: 3.9 MMOL/L (ref 3.4–5.3)
PROT SERPL-MCNC: 7.5 G/DL (ref 6.8–8.8)
SODIUM SERPL-SCNC: 139 MMOL/L (ref 133–144)

## 2019-11-14 PROCEDURE — 36415 COLL VENOUS BLD VENIPUNCTURE: CPT | Performed by: INTERNAL MEDICINE

## 2019-11-14 PROCEDURE — 99213 OFFICE O/P EST LOW 20 MIN: CPT | Mod: 25 | Performed by: INTERNAL MEDICINE

## 2019-11-14 PROCEDURE — G0402 INITIAL PREVENTIVE EXAM: HCPCS | Performed by: INTERNAL MEDICINE

## 2019-11-14 PROCEDURE — 80053 COMPREHEN METABOLIC PANEL: CPT | Performed by: INTERNAL MEDICINE

## 2019-11-14 PROCEDURE — G0009 ADMIN PNEUMOCOCCAL VACCINE: HCPCS | Performed by: INTERNAL MEDICINE

## 2019-11-14 PROCEDURE — 77067 SCR MAMMO BI INCL CAD: CPT | Mod: TC

## 2019-11-14 PROCEDURE — 90670 PCV13 VACCINE IM: CPT | Performed by: INTERNAL MEDICINE

## 2019-11-14 RX ORDER — LEVOTHYROXINE SODIUM 75 UG/1
75 TABLET ORAL DAILY
Qty: 90 TABLET | Refills: 3 | Status: SHIPPED | OUTPATIENT
Start: 2019-11-14 | End: 2020-11-08

## 2019-11-14 ASSESSMENT — ACTIVITIES OF DAILY LIVING (ADL): CURRENT_FUNCTION: NO ASSISTANCE NEEDED

## 2019-11-14 ASSESSMENT — MIFFLIN-ST. JEOR: SCORE: 1265.96

## 2019-11-14 NOTE — LETTER
[facility]  [address] [address]  [phone]      11/20/2019       Kiki Ibrahim  [address] [facility] [address]        Dear Kiki,  Here are your most recent lab results. Unless commented on below, mild variation of results  outside the normal range are not clinically signicant.    Resulted Orders   Comprehensive metabolic panel   Result Value Ref Range    Sodium 139 133 - 144 mmol/L    Potassium 3.9 3.4 - 5.3 mmol/L    Chloride 104 94 - 109 mmol/L    Carbon Dioxide 26 20 - 32 mmol/L    Anion Gap 9 3 - 14 mmol/L    Glucose 93 70 - 99 mg/dL    Urea Nitrogen 17 7 - 30 mg/dL    Creatinine 1.05 (H) 0.52 - 1.04 mg/dL    GFR Estimate 55 (L) >60 mL/min/[1.73_m2]      Comment:      Non  GFR Calc  Starting 12/18/2018, serum creatinine based estimated GFR (eGFR) will be   calculated using the Chronic Kidney Disease Epidemiology Collaboration   (CKD-EPI) equation.      GFR Estimate If Black 64 >60 mL/min/[1.73_m2]      Comment:       GFR Calc  Starting 12/18/2018, serum creatinine based estimated GFR (eGFR) will be   calculated using the Chronic Kidney Disease Epidemiology Collaboration   (CKD-EPI) equation.      Calcium 9.7 8.5 - 10.1 mg/dL    Bilirubin Total 0.5 0.2 - 1.3 mg/dL    Albumin 4.0 3.4 - 5.0 g/dL    Protein Total 7.5 6.8 - 8.8 g/dL    Alkaline Phosphatase 88 40 - 150 U/L    ALT 30 0 - 50 U/L    AST 19 0 - 45 U/L       Electrolyte, Glucose/Sugar and Liver lab results were normal.  Kidney function lab results were mildly abnormal but remain stable compared to the previous studies.  With the stability of your kidney function over many years, I would just recheck your kidney function with a lab again in 1 year.  I would also still  recommend you avoid taking anti-inflammatory medications like Ibuprofen/Advil or Aleve as they can worsen kidney function. It is fine to use Tylenol/ Acetaminophen as needed per package  "instruction dosing as it is metabolized through the liver instead  When I ordered your lab results, I accidentally did not hit the \"sign order\" button for your thyroid function and cholesterol panel so the  did not know to draw those two tests and they were not run on the blood this was drawn. Therefore you will have to return to have a fasting blood draw again to complete the other tests ordered. I apologize for this inconvenience to you. Call our appointment desk at [phone]  to schedule a fasting lab appointment again in the next week.   For fasting labs, please refrain from eating for 8 hours or more.  Be sure to  drink water and take your  medications the day of the test.     Sincerely,      Roberto Montoya MD  Internal Medicine      "

## 2019-11-14 NOTE — PROGRESS NOTES
"SUBJECTIVE:   Kiki Ibrahim is a 64 year old female who presents for Preventive Visit and follow-up re: hyperlipidemia and hypothyroidism.  Are you in the first 12 months of your Medicare coverage?  Yes,  Visual Acuity:  Right Eye: 20/30   Left Eye: 20/25  Both Eyes: 20/25-With Corrective Lenses    Healthy Habits:     In general, how would you rate your overall health?  Good    Frequency of exercise:  4-5 days/week    Duration of exercise:  30-45 minutes    Do you usually eat at least 4 servings of fruit and vegetables a day, include whole grains    & fiber and avoid regularly eating high fat or \"junk\" foods?  No    Taking medications regularly:  Yes    Medication side effects:  None    Ability to successfully perform activities of daily living:  No assistance needed    Home Safety:  No safety concerns identified    Hearing Impairment:  No hearing concerns    In the past 6 months, have you been bothered by leaking of urine?  No    In general, how would you rate your overall mental or emotional health?  Good      PHQ-2 Total Score: 0    Additional concerns today:  No    Do you feel safe in your environment? Yes    Have you ever done Advance Care Planning? (For example, a Health Directive, POLST, or a discussion with a medical provider or your loved ones about your wishes): No, advance care planning information given to patient to review.  Patient plans to discuss their wishes with loved ones or provider.      Colonoscopy Nov 2015. Repeat 5 years  Eye exam  Summer 2019  DEXA July 2016. Repeat 5 years    Fall risk  Fallen 2 or more times in the past year?: Yes  Any fall with injury in the past year?: Yes    Cognitive Screening   1) Repeat 3 items (Leader, Season, Table)    2) Clock draw: NORMAL  3) 3 item recall: Recalls 3 objects  Results: 3 items recalled: COGNITIVE IMPAIRMENT LESS LIKELY    Mini-CogTM Copyright S Richy. Licensed by the author for use in [facility]; reprinted with permission " ([email or web address]). All rights reserved.      Do you have sleep apnea, excessive snoring or daytime drowsiness?: no    Reviewed and updated as needed this visit by clinical staff         Reviewed and updated as needed this visit by Provider        Social History     Tobacco Use     Smoking status: Never Smoker     Smokeless tobacco: Never Used   Substance Use Topics     Alcohol use: Yes     Comment: occasional     If you drink alcohol do you typically have >3 drinks per day or >7 drinks per week? No    Alcohol Use 11/14/2019   Prescreen: >3 drinks/day or >7 drinks/week? Not Applicable   Prescreen: >3 drinks/day or >7 drinks/week? -             Current providers sharing in care for this patient include:   Patient Care Team:  Roberto Montoya MD as PCP - General  Roberto Montoya MD as Assigned PCP    The following health maintenance items are reviewed in Epic and correct as of today:  Health Maintenance   Topic Date Due     ZOSTER IMMUNIZATION (2 of 3) 04/09/2015     ADVANCE CARE PLANNING  05/09/2016     PHQ-2  01/01/2019     FALL RISK ASSESSMENT  05/05/2019     PNEUMOCOCCAL IMMUNIZATION 65+ LOW/MEDIUM RISK (1 of 2 - PCV13) 05/05/2019     INFLUENZA VACCINE (1) 09/01/2019     MEDICARE ANNUAL WELLNESS VISIT  09/21/2019     TSH W/FREE T4 REFLEX  09/21/2019     MAMMO SCREENING  09/28/2019     DTAP/TDAP/TD IMMUNIZATION (3 - Td) 05/07/2020     LIPID  09/21/2023     COLONOSCOPY  10/27/2025     DEXA  Completed     HEPATITIS C SCREENING  Completed     HIV SCREENING  Addressed     IPV IMMUNIZATION  Aged Out     MENINGITIS IMMUNIZATION  Aged Out     Labs reviewed in EPIC    Component      Latest Ref Rng & Units 9/21/2018   Sodium      133 - 144 mmol/L 138   Potassium      3.4 - 5.3 mmol/L 4.1   Chloride      94 - 109 mmol/L 104   Carbon Dioxide      20 - 32 mmol/L 28   Anion Gap      3 - 14 mmol/L 6   Glucose      70 - 99 mg/dL 90   Urea Nitrogen      7 - 30 mg/dL 21   Creatinine      0.52 - 1.04 mg/dL 1.01   GFR Estimate      >60  "mL/min/1.7m2 55 (L)   GFR Estimate If Black      >60 mL/min/1.7m2 67   Calcium      8.5 - 10.1 mg/dL 9.1   Cholesterol      <200 mg/dL 275 (H)   Triglycerides      <150 mg/dL 60   HDL Cholesterol      >49 mg/dL 81   LDL Cholesterol Calculated      <100 mg/dL 182 (H)   Non HDL Cholesterol      <130 mg/dL 194 (H)   TSH      0.40 - 4.00 mU/L 2.57       No identified additional risks  The 10-year ASCVD risk score (Esperanza FERMIN Mendoza, et al., 2013) is: 5.3%    Values used to calculate the score:      Age: 64 years      Sex: Female      Is Non- : No      Diabetic: No      Tobacco smoker: No      Systolic Blood Pressure: 124 mmHg      Is BP treated: No      HDL Cholesterol: 81 mg/dL      Total Cholesterol: 275 mg/dL    Review of Systems  CONSTITUTIONAL: NEGATIVE for fever, chills, change in weight overall past 1 year  INTEGUMENTARY/SKIN: NEGATIVE for worrisome rashes, moles or lesions  EYES: NEGATIVE for  Irritation. Slight  Reduction acuity right eye with cataract not to point of surgery. Eye exam  Summer 2019  ENT/MOUTH: NEGATIVE for ear, mouth and throat problems  RESP: NEGATIVE for significant cough or SOB  BREAST: NEGATIVE for masses, tenderness or discharge  CV: NEGATIVE for chest pain, palpitations or peripheral edema  GI: NEGATIVE for nausea, abdominal pain, heartburn, or change in bowel habits  : NEGATIVE for frequency, dysuria, or hematuria  MUSCULOSKELETAL: NEGATIVE for significant arthralgias or myalgia  NEURO: NEGATIVE for weakness, dizziness or paresthesias  ENDOCRINE:  POSITIVE for hypothyroidism, hyperlipidemia. Very rare hot flash that is not bothersome  HEME: NEGATIVE for bleeding problems  PSYCHIATRIC: NEGATIVE for changes in mood or affect    OBJECTIVE:   LMP 08/24/2004  Estimated body mass index is 22.63 kg/m  as calculated from the following:    Height as of 9/21/18: 1.651 m (5' 5\").    Weight as of 8/5/19: 61.7 kg (136 lb).  Physical Exam  General appearance - healthy, alert, no " distress  Skin - No rashes or lesions.  Head - normocephalic, atraumatic  Eyes - TIM, EOMI, fundi exam with nondilated pupils negative.  Ears - External ears normal. Canals clear. TM's normal.  Nose/Sinuses - Nares normal. Septum midline. Mucosa normal. No drainage or sinus tenderness.  Oropharynx - No erythema, no adenopathy, no exudates.  Neck - Supple without adenopathy or thyromegaly. No bruits.  Lungs - Clear to auscultation without wheezes/rhonchi.  Heart - Regular rate and rhythm without murmurs, clicks, or gallops.  Nodes - No supraclavicular, axillary, or inguinal adenopathy palpable.  Breasts - deferred  Abdomen - Abdomen soft, non-tender. BS normal. No masses or hepatosplenomegaly palpable. No bruits.  Extremities -No cyanosis, clubbing or edema.    Musculoskeletal - Spine ROM normal. Muscular strength intact.   Peripheral pulses - radial=4/4, femoral=4/4, posterior tibial=4/4, dorsalis pedis=4/4,  Neuro - Gait normal. Reflexes normal and symmetric. Sensation grossly WNL.  Genital/Rectal - deferred          ASSESSMENT / PLAN:   1. Medicare annual wellness visit, initial  See below for healthcare maintenance discussion.  Otherwise up-to-date    2. Hypothyroidism, unspecified type  On thyroid replacement therapy.  Energy good.  Due for lab follow-up.  Continue current medication dosing pending lab results  - levothyroxine (SYNTHROID/LEVOTHROID) 75 MCG tablet; Take 1 tablet (75 mcg) by mouth daily  Dispense: 90 tablet; Refill: 3  - TSH with free T4 reflex; Future    3. Hyperlipidemia LDL goal <100  Previous elevated lipids not quite to the point of needing statin therapy based on 10-year CAD risk.  Due for recheck.  See counseling below regarding diet  - Lipid panel reflex to direct LDL Fasting; Future    4. Encounter for screening mammogram for breast cancer  - *MA Screening Digital Bilateral; Future    5. Screening for diabetes mellitus  - Comprehensive metabolic panel    6. Need for prophylactic  "vaccination against Streptococcus pneumoniae (pneumococcus)  - Pneumococcal vaccine 13 valent PCV13 IM (Prevnar) [44239]  - ADMIN MEDICARE: Pneumococcal Vaccine ()    COUNSELING:  Reviewed preventive health counseling, as reflected in patient instructions    Estimated body mass index is 22.63 kg/m  as calculated from the following:    Height as of 9/21/18: 1.651 m (5' 5\").    Weight as of 8/5/19: 61.7 kg (136 lb).    Weight management plan noted, stable and monitoring     reports that she has never smoked. She has never used smokeless tobacco.      Appropriate preventive services were discussed with this patient, including applicable screening as appropriate for cardiovascular disease, diabetes, osteopenia/osteoporosis, and glaucoma.  As appropriate for age/gender, discussed screening for colorectal cancer, prostate cancer, breast cancer, and cervical cancer. Checklist reviewing preventive services available has been given to the patient.    Reviewed patients plan of care and provided an AVS. The Basic Care Plan (routine screening as documented in Health Maintenance) for Kiki meets the Care Plan requirement. This Care Plan has been established and reviewed with the Patient.    Counseling Resources:  ATP IV Guidelines  Pooled Cohorts Equation Calculator  Breast Cancer Risk Calculator  FRAX Risk Assessment  ICSI Preventive Guidelines  Dietary Guidelines for Americans, 2010  USDA's MyPlate  ASA Prophylaxis  Lung CA Screening      PLAN:  Continue current meds. Refill done on file  Labs today as ordered  Mammogram   - [place]  Vaccinations: Prevnar 13 vaccine  Check with insurance or speak with your pharmacist re: Shingrix vaccine coverage for shingles prevention.  This is a 2 shot series done 2-6 months apart  Pt declines flu vaccine   Healthcare  Directive discussed and given copy.   Patient to complete and return to clinic  Repeat bone density test in 2 years   Will repeat colonoscopy in 1 year  Reduce saturated " fats (red meats, fried and processed foods) in your diet and increase the amount of color on your plate with fruits and vegetables.   Pt was informed regarding extra E&M billing for management of new or established medical issues not related to today's wellness visit      Roberto Montoya MD  [facility]

## 2019-11-14 NOTE — PATIENT INSTRUCTIONS
Continue current meds. Refill done on file  Labs today as ordered  Mammogram   - [place]  Vaccinations: Prevnar 13 vaccine  Check with insurance or speak with your pharmacist re: Shingrix vaccine coverage for shingles prevention.  This is a 2 shot series done 2-6 months apart  Pt declines flu vaccine   Healthcare  Directive discussed and given copy.   Patient to complete and return to clinic  Repeat bone density test in 2 years   Will repeat colonoscopy in 1 year  Reduce saturated fats (red meats, fried and processed foods) in your diet and increase the amount of color on your plate with fruits and vegetables.   Pt was informed regarding extra E&M billing for management of new or established medical issues not related to today's wellness visit

## 2019-12-11 DIAGNOSIS — E78.5 HYPERLIPIDEMIA LDL GOAL <100: ICD-10-CM

## 2019-12-11 DIAGNOSIS — R94.4 ABNORMAL RENAL FUNCTION TEST: ICD-10-CM

## 2019-12-11 DIAGNOSIS — Z13.1 SCREENING FOR DIABETES MELLITUS: Primary | ICD-10-CM

## 2019-12-11 DIAGNOSIS — E03.9 HYPOTHYROIDISM, UNSPECIFIED TYPE: ICD-10-CM

## 2019-12-11 PROCEDURE — 84443 ASSAY THYROID STIM HORMONE: CPT | Performed by: INTERNAL MEDICINE

## 2019-12-11 PROCEDURE — 80061 LIPID PANEL: CPT | Performed by: INTERNAL MEDICINE

## 2019-12-11 PROCEDURE — 36415 COLL VENOUS BLD VENIPUNCTURE: CPT | Performed by: INTERNAL MEDICINE

## 2019-12-11 NOTE — LETTER
[facility]  [address] [address]  [phone]      12/14/2019       Kiki Ibrahim  [address] [facility] [address]        Dear Kiki,  Here are your most recent lab results. Unless commented on below, mild variation of results  outside the normal range are not clinically signicant.    Resulted Orders   TSH with free T4 reflex   Result Value Ref Range    TSH 3.32 0.40 - 4.00 mU/L   Lipid panel reflex to direct LDL Fasting   Result Value Ref Range    Cholesterol 251 (H) <200 mg/dL      Comment:      Desirable:       <200 mg/dl    Triglycerides 58 <150 mg/dL      Comment:      Fasting specimen    HDL Cholesterol 79 >49 mg/dL    LDL Cholesterol Calculated 160 (H) <100 mg/dL      Comment:      Above desirable:  100-129 mg/dl  Borderline High:  130-159 mg/dL  High:             160-189 mg/dL  Very high:       >189 mg/dl      Non HDL Cholesterol 172 (H) <130 mg/dL      Comment:      Above Desirable:  130-159 mg/dl  Borderline high:  160-189 mg/dl  High:             190-219 mg/dl  Very high:       >219 mg/dl         HDL (good) Cholesterol, Triglycerides and Thyroid lab results were normal.  Total Cholesterol, LDL (bad) Cholesterol and Non-HDL (bad) cholesterol lab results were abnormal.  Abnormalities of the cholesterol lab values increase the risk for heart and other vascular disease. Your current calculated risk of developing coronary artery disease in the next 10 years  (5.1%) is below that where the use of medication to lower your cholesterol levels is  indicated.  However, that risk will increase with age, even if the cholesterol levels remain the same. Therefore it is important to try and make lifestyle changes now to improve those levels.  Reduce saturated fats (red meats, fried and processed foods) in your diet and increase the amount of color on your plate with fruits and vegetables.  Increase frequency of walking or other exercise  Continue  "current medication.  Please contact your pharmacy if you need further refills of your medication.   Call our appointment desk at [phone] or use [employer] to schedule a \"lab only\" to have your Basic Metabolic Panel, Lipid profile (Cholesterol Panel) and TSH checked fasting in 1 year.  For fasting labs, please refrain from eating for 8 hours or more.  Be sure to  drink water and take your  medications the day of the test.  Please make an appointment to see me  a few days after the lab test is drawn to discuss results and follow-up on your medical issues or earlier as needed.    If you have further questions/concerns regarding the results, I would ask that you bring them to your next follow-up appointment with me and I would be happy to review them with you further.      Sincerely,      Roberto Montoya MD  Internal Medicine       "

## 2019-12-12 LAB
CHOLEST SERPL-MCNC: 251 MG/DL
HDLC SERPL-MCNC: 79 MG/DL
LDLC SERPL CALC-MCNC: 160 MG/DL
NONHDLC SERPL-MCNC: 172 MG/DL
TRIGL SERPL-MCNC: 58 MG/DL
TSH SERPL DL<=0.005 MIU/L-ACNC: 3.32 MU/L (ref 0.4–4)

## 2020-02-07 ENCOUNTER — DOCUMENTATION ONLY (OUTPATIENT)
Dept: OTHER | Facility: CLINIC | Age: 65
End: 2020-02-07

## 2020-02-07 ENCOUNTER — AMBULATORY - HEALTHEAST (OUTPATIENT)
Dept: OTHER | Facility: CLINIC | Age: 65
End: 2020-02-07

## 2020-02-28 ENCOUNTER — AMBULATORY - HEALTHEAST (OUTPATIENT)
Dept: OTHER | Facility: CLINIC | Age: 65
End: 2020-02-28

## 2020-02-28 ENCOUNTER — DOCUMENTATION ONLY (OUTPATIENT)
Dept: OTHER | Facility: CLINIC | Age: 65
End: 2020-02-28

## 2020-10-19 ENCOUNTER — OFFICE VISIT (OUTPATIENT)
Dept: INTERNAL MEDICINE | Facility: CLINIC | Age: 65
End: 2020-10-19
Payer: MEDICARE

## 2020-10-19 VITALS
DIASTOLIC BLOOD PRESSURE: 80 MMHG | TEMPERATURE: 97.8 F | HEART RATE: 88 BPM | BODY MASS INDEX: 29.88 KG/M2 | SYSTOLIC BLOOD PRESSURE: 124 MMHG | RESPIRATION RATE: 16 BRPM | OXYGEN SATURATION: 99 % | HEIGHT: 64 IN | WEIGHT: 175 LBS

## 2020-10-19 DIAGNOSIS — Z12.11 SPECIAL SCREENING FOR MALIGNANT NEOPLASMS, COLON: ICD-10-CM

## 2020-10-19 DIAGNOSIS — E03.9 HYPOTHYROIDISM, UNSPECIFIED TYPE: ICD-10-CM

## 2020-10-19 DIAGNOSIS — M67.40 GANGLION CYST: ICD-10-CM

## 2020-10-19 DIAGNOSIS — Z23 NEED FOR PROPHYLACTIC VACCINATION AGAINST STREPTOCOCCUS PNEUMONIAE (PNEUMOCOCCUS): ICD-10-CM

## 2020-10-19 DIAGNOSIS — Z12.31 ENCOUNTER FOR SCREENING MAMMOGRAM FOR BREAST CANCER: ICD-10-CM

## 2020-10-19 DIAGNOSIS — E78.5 HYPERLIPIDEMIA LDL GOAL <100: ICD-10-CM

## 2020-10-19 PROCEDURE — 90732 PPSV23 VACC 2 YRS+ SUBQ/IM: CPT | Performed by: INTERNAL MEDICINE

## 2020-10-19 PROCEDURE — 99214 OFFICE O/P EST MOD 30 MIN: CPT | Mod: 25 | Performed by: INTERNAL MEDICINE

## 2020-10-19 PROCEDURE — G0009 ADMIN PNEUMOCOCCAL VACCINE: HCPCS | Performed by: INTERNAL MEDICINE

## 2020-10-19 ASSESSMENT — MIFFLIN-ST. JEOR: SCORE: 1310.85

## 2020-10-19 NOTE — PROGRESS NOTES
"Subjective     Kiki Ibrahim is a 65 year old female who presents to clinic today for the following health issues:    HPI       Chief Complaint   Patient presents with     Mass     Patient has lumps in Right Hand since August/2020, that have chnged in size      Pt's past medical history, family history, habits, medications and allergies were reviewed with the patient today.  See snap shot for  HCM status. Most recent lab results reviewed with pt. Problem list and histories reviewed & adjusted, as indicated.  Additional history as below:    Also due for f/u re: hypothyroidism and hyperlipidemia   Weight up 11 pounds in 1 year. Energy OK. No constipation   Due for tetanus  Hx colon polyps and due for f/u conolonscopy. Denies seeing blood in stools  Due for mammogram in November  Does a lot of gardening. Has developed a lump in left palm over past 2 mos. No pain now., Not restricting ROM fingers     Additional ROS:   Constitutional, HEENT, Cardiovascular, Pulmonary, GI and , Neuro, MSK and Psych review of systems/symptoms are otherwise negative or unchanged from previous, except as noted above.      OBJECTIVE:  /80   Pulse 88   Temp 97.8  F (36.6  C) (Temporal)   Resp 16   Ht 1.613 m (5' 3.5\")   Wt 79.4 kg (175 lb)   LMP 08/24/2004   SpO2 99%   BMI 30.51 kg/m     Estimated body mass index is 30.51 kg/m  as calculated from the following:    Height as of this encounter: 1.613 m (5' 3.5\").    Weight as of this encounter: 79.4 kg (175 lb).     Neck: no adenopathy. Thyroid normal to palpation. No bruits  Pulm: Lungs clear to auscultation   CV: Regular rates and rhythm  GI: Soft, mildly obese, nontender, Normal active bowel sounds, No hepatosplenomegaly or masses palpable  Ext: Peripheral pulses intact. No edema. Left palmar area shows two 2-3mm ganglion cysts over the common  3rd/4th finger  Flexion tendon area. Able to still fully extend  3rd and 4th fingers  Neuro: Normal strength and tone, sensory exam " grossly normal    Assessment/Plan: (See plan discussion below for further details)  1. Ganglion cyst   fingers not contracted yet. Pt to observe and be sure to wear good work gloves when doing yardwork. If increasing in size or causes any pain or reduced ROM, pt will then see hand specialist  - Orthopedic & Spine  Referral; Future    2. Hypothyroidism, unspecified type  On levothyroxine.  Weight up  No cold intolerance or constipation.  Due for lab follow-up as previously ordered.  Continue current medication pending lab results    3. Hyperlipidemia LDL goal <100  Previous lipid elevation.  Fasting lab as previously ordered in the next 1 to 2 weeks.  We will then calculate 10-year CAD risk and treat as appropriate    4. Encounter for screening mammogram for breast cancer  - *MA Screening Digital Bilateral; Future    5. Special screening for malignant neoplasms, colon  History of colon polyps due for 5-year follow-up colonoscopy  - GASTROENTEROLOGY ADULT REF PROCEDURE ONLY; Future    6. Need for prophylactic vaccination against Streptococcus pneumoniae (pneumococcus)  - Pneumococcal vaccine 23 valent PPSV23  (Pneumovax) [02189]  - ADMIN MEDICARE: Pneumococcal Vaccine ()    Plan discussion:   Continue current meds  Call  [phone] or use [employer] to schedule a future lab appointment  fasting in next couple weeks   For fasting labs, please refrain from eating for 8 hours or more.   Drink 2 glasses of water before your lab appointment. It is fine to take your  oral medications on the morning of the lab test as usual  Vaccinations: Pneumococcal 23 vaccine  Mammogram after 11/15/20  Colonoscopy  with  [facility]. They will call to schedule. If not heard from them in 1 week, then call [phone].    Tetanus (TD) vaccine at pharmacy  in near future  I would recommend you receive an influenza (flu) vaccine  this Fall (October). Pt declines today  We recommend head protection when doing yard work  or other activities to cause friction to the left palm area.  If hand becoming bothersome or cysts increasing in size, then see Dr Petra Tracey (hand specialist)  at [facility] in [place] [phone]       Roberto Montoya MD  Internal Medicine Department  [facility]    (Chart documentation was completed, in part, with [employer] voice-recognition software. Even though reviewed, some grammatical, spelling, and word errors may remain.)

## 2020-10-19 NOTE — PATIENT INSTRUCTIONS
Continue current meds  Call  [phone] or use [employer] to schedule a future lab appointment  fasting in next few weeks   For fasting labs, please refrain from eating for 8 hours or more.   Drink 2 glasses of water before your lab appointment. It is fine to take your  oral medications on the morning of the lab test as usual  Vaccinations: Pneumococcal 23 vaccine  Mammogram after 11/15/20  Colonoscopy  with  [facility]. They will call to schedule. If not heard from them in 1 week, then call [phone].    Tetanus (TD) vaccine at pharmacy  in near future  I would recommend you receive an influenza (flu) vaccine  this Fall (October). Pt declines today  We recommend head protection when doing yard work or other activities to cause friction to the left palm area.  If hand becoming bothersome or cysts increasing in size, then see Dr Petra Tracey (hand specialist)  at [facility] in [place] [phone]

## 2020-11-04 ENCOUNTER — TRANSFERRED RECORDS (OUTPATIENT)
Dept: HEALTH INFORMATION MANAGEMENT | Facility: CLINIC | Age: 65
End: 2020-11-04

## 2020-11-07 DIAGNOSIS — E03.9 HYPOTHYROIDISM, UNSPECIFIED TYPE: ICD-10-CM

## 2020-11-08 RX ORDER — LEVOTHYROXINE SODIUM 75 UG/1
TABLET ORAL
Qty: 90 TABLET | Refills: 3 | Status: SHIPPED | OUTPATIENT
Start: 2020-11-08 | End: 2021-11-03

## 2020-11-16 ENCOUNTER — ANCILLARY PROCEDURE (OUTPATIENT)
Dept: MAMMOGRAPHY | Facility: CLINIC | Age: 65
End: 2020-11-16
Attending: INTERNAL MEDICINE
Payer: MEDICARE

## 2020-11-16 DIAGNOSIS — E03.9 HYPOTHYROIDISM, UNSPECIFIED TYPE: ICD-10-CM

## 2020-11-16 DIAGNOSIS — E78.5 HYPERLIPIDEMIA LDL GOAL <100: ICD-10-CM

## 2020-11-16 DIAGNOSIS — R94.4 ABNORMAL RENAL FUNCTION TEST: ICD-10-CM

## 2020-11-16 DIAGNOSIS — Z13.1 SCREENING FOR DIABETES MELLITUS: ICD-10-CM

## 2020-11-16 DIAGNOSIS — Z12.31 ENCOUNTER FOR SCREENING MAMMOGRAM FOR BREAST CANCER: ICD-10-CM

## 2020-11-16 LAB
ANION GAP SERPL CALCULATED.3IONS-SCNC: 2 MMOL/L (ref 3–14)
BUN SERPL-MCNC: 22 MG/DL (ref 7–30)
CALCIUM SERPL-MCNC: 9.2 MG/DL (ref 8.5–10.1)
CHLORIDE SERPL-SCNC: 105 MMOL/L (ref 94–109)
CHOLEST SERPL-MCNC: 267 MG/DL
CO2 SERPL-SCNC: 29 MMOL/L (ref 20–32)
CREAT SERPL-MCNC: 1.08 MG/DL (ref 0.52–1.04)
GFR SERPL CREATININE-BSD FRML MDRD: 53 ML/MIN/{1.73_M2}
GLUCOSE SERPL-MCNC: 107 MG/DL (ref 70–99)
HDLC SERPL-MCNC: 77 MG/DL
LDLC SERPL CALC-MCNC: 173 MG/DL
NONHDLC SERPL-MCNC: 190 MG/DL
POTASSIUM SERPL-SCNC: 4.1 MMOL/L (ref 3.4–5.3)
SODIUM SERPL-SCNC: 136 MMOL/L (ref 133–144)
TRIGL SERPL-MCNC: 84 MG/DL
TSH SERPL DL<=0.005 MIU/L-ACNC: 1.15 MU/L (ref 0.4–4)

## 2020-11-16 PROCEDURE — 84443 ASSAY THYROID STIM HORMONE: CPT | Performed by: INTERNAL MEDICINE

## 2020-11-16 PROCEDURE — 36415 COLL VENOUS BLD VENIPUNCTURE: CPT | Performed by: INTERNAL MEDICINE

## 2020-11-16 PROCEDURE — 77067 SCR MAMMO BI INCL CAD: CPT | Performed by: RADIOLOGY

## 2020-11-16 PROCEDURE — 80061 LIPID PANEL: CPT | Performed by: INTERNAL MEDICINE

## 2020-11-16 PROCEDURE — 80048 BASIC METABOLIC PNL TOTAL CA: CPT | Performed by: INTERNAL MEDICINE

## 2021-04-30 ENCOUNTER — TRANSFERRED RECORDS (OUTPATIENT)
Dept: HEALTH INFORMATION MANAGEMENT | Facility: CLINIC | Age: 66
End: 2021-04-30

## 2021-11-02 DIAGNOSIS — E03.9 HYPOTHYROIDISM, UNSPECIFIED TYPE: ICD-10-CM

## 2021-11-03 RX ORDER — LEVOTHYROXINE SODIUM 75 UG/1
TABLET ORAL
Qty: 90 TABLET | Refills: 0 | Status: SHIPPED | OUTPATIENT
Start: 2021-11-03 | End: 2022-01-14

## 2021-11-15 ENCOUNTER — OFFICE VISIT (OUTPATIENT)
Dept: INTERNAL MEDICINE | Facility: CLINIC | Age: 66
End: 2021-11-15
Payer: MEDICARE

## 2021-11-15 VITALS
SYSTOLIC BLOOD PRESSURE: 126 MMHG | TEMPERATURE: 98.7 F | HEIGHT: 64 IN | BODY MASS INDEX: 29.28 KG/M2 | HEART RATE: 96 BPM | WEIGHT: 171.5 LBS | DIASTOLIC BLOOD PRESSURE: 82 MMHG | OXYGEN SATURATION: 100 %

## 2021-11-15 DIAGNOSIS — N18.31 CHRONIC KIDNEY DISEASE, STAGE 3A (H): ICD-10-CM

## 2021-11-15 DIAGNOSIS — H26.9 CATARACT OF BOTH EYES, UNSPECIFIED CATARACT TYPE: ICD-10-CM

## 2021-11-15 DIAGNOSIS — Z01.818 ENCOUNTER FOR PREOPERATIVE ASSESSMENT: Primary | ICD-10-CM

## 2021-11-15 PROCEDURE — 99214 OFFICE O/P EST MOD 30 MIN: CPT | Performed by: INTERNAL MEDICINE

## 2021-11-15 ASSESSMENT — MIFFLIN-ST. JEOR: SCORE: 1289.98

## 2021-11-15 NOTE — PATIENT INSTRUCTIONS
- Do not take any fish oil supplements, vitamin E supplements, or NSAIDs like ibuprofen, aspirin, or Aleve for 5 days prior to surgery. Tylenol is okay to take.

## 2021-11-15 NOTE — PROGRESS NOTES
[address] [address]  Phone: [phone]  Primary Provider: Roberto Montoya  Pre-op Performing Provider: TIERA SHARP    PREOPERATIVE EVALUATION:  Today's date: 11/15/2021    Kiki Ibrahim is a 66 year old female who presents for a preoperative evaluation.    Surgical Information:  Surgery/Procedure: Cataract Surgery   Surgery Location: [facility]   Surgeon: Dr. Edmonds   Surgery Date: 11/17/21 Right and 12/1/21 Left  Time of Surgery: AM  Where patient plans to recover: At home with family  Fax number for surgical facility: [phone]    Type of Anesthesia Anticipated: to be determined    Assessment & Plan     The proposed surgical procedure is considered LOW risk.    Encounter for preoperative assessment  Cataract of both eyes, unspecified cataract type  Approval given to proceed with proposed procedure without further diagnostic evaluation. Discussed recommendation to discontinue NSAIDs 5 days prior to procedure to reduce bleeding risk. Medications were reviewed in detail today. On the morning of surgery, take all normal medications with a small sip of water. Resume all medications post-operatively at the same doses unless otherwise directed by surgical care team.    Chronic kidney disease, stage 3a (H)  Known issue that I take into account for their medical decisions, no current exacerbations or new concerns.    RECOMMENDATION:  APPROVAL GIVEN to proceed with proposed procedure, without further diagnostic evaluation.    Subjective     HPI related to upcoming procedure: Kiki presents for a pre-op exam. She has no other complaints. She reports no personal history of cardiac disease. She is able to walk up a flight of stairs without stopping, getting short of breath, or developing chest pain.    Preop Questions 11/15/2021   1. Have you ever had a heart attack or stroke? No   2. Have you ever had surgery on your heart or  blood vessels, such as a stent placement, a coronary artery bypass, or surgery on an artery in your head, neck, heart, or legs? No   3. Do you have chest pain with activity? No   4. Do you have a history of  heart failure? No   5. Do you currently have a cold, bronchitis or symptoms of other infection? No   6. Do you have a cough, shortness of breath, or wheezing? No   7. Do you or anyone in your family have previous history of blood clots? UNKNOWN - Noted.   8. Do you or does anyone in your family have a serious bleeding problem such as prolonged bleeding following surgeries or cuts? No   9. Have you ever had problems with anemia or been told to take iron pills? No   10. Have you had any abnormal blood loss such as black, tarry or bloody stools, or abnormal vaginal bleeding? No   11. Have you ever had a blood transfusion? No   12. Are you willing to have a blood transfusion if it is medically needed before, during, or after your surgery? Yes   13. Have you or any of your relatives ever had problems with anesthesia? No   14. Do you have sleep apnea, excessive snoring or daytime drowsiness? No   15. Do you have any artifical heart valves or other implanted medical devices like a pacemaker, defibrillator, or continuous glucose monitor? No   16. Do you have artificial joints? No   17. Are you allergic to latex? No     Health Care Directive:  Patient has a Health Care Directive on file    Preoperative Review of :   reviewed - no record of controlled substances prescribed.    Review of Systems  CONSTITUTIONAL: NEGATIVE for fever, chills, change in weight  ENT/MOUTH: NEGATIVE for ear, mouth and throat problems  RESP: NEGATIVE for significant cough or SOB  CV: NEGATIVE for chest pain, palpitations or peripheral edema    Patient Active Problem List    Diagnosis Date Noted     Chronic kidney disease, stage 3a (H) 11/15/2021     Priority: Medium     Hyperlipidemia LDL goal <100 10/19/2020     Priority: Medium      "Ganglion cyst 10/19/2020     Priority: Medium     Hypothyroidism, unspecified type 2016     Priority: Medium     Osteopenia 2016     Priority: Medium     Colon polyps 2015     Priority: Medium     Vitamin D deficiency 2013     Priority: Medium     Hx of breast cancer 2013     Priority: Medium      Past Medical History:   Diagnosis Date     Colon polyps  2015     Hyperlipidemia LDL goal <160       Lateral epicondylitis  of elbow     right     Malignant neoplasm of breast (female), unspecified site      Left breast. Infiltrating dictal carcinoma     OSTEOPENIA      Unspecified hypothyroidism      Vitamin D deficiency 3/20/2013     Past Surgical History:   Procedure Laterality Date     C APPENDECTOMY       C  DELIVERY ONLY      , Low Cervical X 2     C LIGATE FALLOPIAN TUBE,POSTPARTUM      Tubal Ligation     ZZC NONSPECIFIC PROCEDURE       Left breast lumpectomy with sentinel node bx     Current Outpatient Medications   Medication Sig Dispense Refill     Cholecalciferol (VITAMIN D) 2000 UNITS tablet Take 2,000 Units by mouth daily. 100 tablet 3     levothyroxine (SYNTHROID/LEVOTHROID) 75 MCG tablet TAKE 1 TABLET DAILY 90 tablet 0     MULTIVITAMIN TABS   OR 1 tablet daily         Allergies   Allergen Reactions     Fosamax      Constipation      Social History     Tobacco Use     Smoking status: Never Smoker     Smokeless tobacco: Never Used   Substance Use Topics     Alcohol use: Yes     Comment: occasional     History   Drug Use No         Objective   /82   Pulse 96   Temp 98.7  F (37.1  C) (Tympanic)   Ht 1.613 m (5' 3.5\")   Wt 77.8 kg (171 lb 8 oz)   LMP 2004   SpO2 100%   BMI 29.90 kg/m      Physical Exam  GENERAL: alert and in no distress.  EYES: conjunctivae/corneas clear. EOMs grossly intact  HENT: Facies symmetric.  RESP: CTAB, no w/r/r.  CV: RRR, no m/r/g.  GI: NT, ND  MSK: No edema. Moves all four extremities freely.  SKIN: No " significant ulcers, lesions, or rashes on the visualized portions of the skin  NEURO: Alert. Oriented.    Recent Labs   Lab Test 11/16/20  0918      POTASSIUM 4.1   CR 1.08*      Diagnostics:  No labs were ordered during this visit.   No EKG required for low risk surgery (cataract, skin procedure, breast biopsy, etc).    Revised Cardiac Risk Index (RCRI):  The patient has the following serious cardiovascular risks for perioperative complications:   - No serious cardiac risks = 0 points     RCRI Interpretation: 0 points: Class I (very low risk - 0.4% complication rate)      Signed Electronically by: Otis Duong MD  Copy of this evaluation report is provided to requesting physician.

## 2021-11-16 ENCOUNTER — TELEPHONE (OUTPATIENT)
Dept: INTERNAL MEDICINE | Facility: CLINIC | Age: 66
End: 2021-11-16
Payer: COMMERCIAL

## 2021-11-16 NOTE — TELEPHONE ENCOUNTER
Patient called the clinic asking why her AVS from 11/15/21 OV had this:    Chronic kidney disease, stage 3a     - She wants to know what this means for her and why nobody has ever talked to her about it.

## 2021-11-16 NOTE — TELEPHONE ENCOUNTER
Common condition, especially for her age, that is being monitored by her PCP. No concerning findings at this time regarding her kidney function. This showed up on her AVS due to coding requirements. I encourage her to discuss this further with her regular PCP at her next visit but again this is not acutely concerning and is being monitored appropriately by her regular care team.

## 2022-01-14 ENCOUNTER — OFFICE VISIT (OUTPATIENT)
Dept: INTERNAL MEDICINE | Facility: CLINIC | Age: 67
End: 2022-01-14
Payer: MEDICARE

## 2022-01-14 VITALS
HEART RATE: 78 BPM | OXYGEN SATURATION: 98 % | BODY MASS INDEX: 29.88 KG/M2 | DIASTOLIC BLOOD PRESSURE: 78 MMHG | TEMPERATURE: 97.5 F | WEIGHT: 175 LBS | HEIGHT: 64 IN | SYSTOLIC BLOOD PRESSURE: 124 MMHG | RESPIRATION RATE: 16 BRPM

## 2022-01-14 DIAGNOSIS — Z12.31 ENCOUNTER FOR SCREENING MAMMOGRAM FOR BREAST CANCER: ICD-10-CM

## 2022-01-14 DIAGNOSIS — E78.5 HYPERLIPIDEMIA LDL GOAL <100: ICD-10-CM

## 2022-01-14 DIAGNOSIS — R73.9 BLOOD GLUCOSE ELEVATED: ICD-10-CM

## 2022-01-14 DIAGNOSIS — Z78.0 ASYMPTOMATIC MENOPAUSAL STATE: ICD-10-CM

## 2022-01-14 DIAGNOSIS — E03.9 HYPOTHYROIDISM, UNSPECIFIED TYPE: ICD-10-CM

## 2022-01-14 DIAGNOSIS — N18.31 CHRONIC KIDNEY DISEASE, STAGE 3A (H): ICD-10-CM

## 2022-01-14 DIAGNOSIS — Z23 HIGH PRIORITY FOR 2019-NCOV VACCINE: ICD-10-CM

## 2022-01-14 DIAGNOSIS — Z00.00 MEDICARE ANNUAL WELLNESS VISIT, INITIAL: ICD-10-CM

## 2022-01-14 LAB
ANION GAP SERPL CALCULATED.3IONS-SCNC: 8 MMOL/L (ref 3–14)
BUN SERPL-MCNC: 18 MG/DL (ref 7–30)
CALCIUM SERPL-MCNC: 9.2 MG/DL (ref 8.5–10.1)
CHLORIDE BLD-SCNC: 108 MMOL/L (ref 94–109)
CHOLEST SERPL-MCNC: 276 MG/DL
CO2 SERPL-SCNC: 25 MMOL/L (ref 20–32)
CREAT SERPL-MCNC: 0.99 MG/DL (ref 0.52–1.04)
CREAT UR-MCNC: 94 MG/DL
DEPRECATED CALCIDIOL+CALCIFEROL SERPL-MC: 52 UG/L (ref 20–75)
FASTING STATUS PATIENT QL REPORTED: YES
GFR SERPL CREATININE-BSD FRML MDRD: 62 ML/MIN/1.73M2
GLUCOSE BLD-MCNC: 96 MG/DL (ref 70–99)
HBA1C MFR BLD: 5.4 % (ref 0–5.6)
HDLC SERPL-MCNC: 83 MG/DL
HGB BLD-MCNC: 13.6 G/DL (ref 11.7–15.7)
LDLC SERPL CALC-MCNC: 180 MG/DL
MICROALBUMIN UR-MCNC: 5 MG/L
MICROALBUMIN/CREAT UR: 5.32 MG/G CR (ref 0–25)
NONHDLC SERPL-MCNC: 193 MG/DL
PHOSPHATE SERPL-MCNC: 3.5 MG/DL (ref 2.5–4.5)
POTASSIUM BLD-SCNC: 4.1 MMOL/L (ref 3.4–5.3)
PTH-INTACT SERPL-MCNC: 30 PG/ML (ref 18–80)
SODIUM SERPL-SCNC: 141 MMOL/L (ref 133–144)
TRIGL SERPL-MCNC: 65 MG/DL
TSH SERPL DL<=0.005 MIU/L-ACNC: 1.23 MU/L (ref 0.4–4)

## 2022-01-14 PROCEDURE — 84100 ASSAY OF PHOSPHORUS: CPT | Performed by: INTERNAL MEDICINE

## 2022-01-14 PROCEDURE — 80061 LIPID PANEL: CPT | Performed by: INTERNAL MEDICINE

## 2022-01-14 PROCEDURE — 0064A COVID-19,PF,MODERNA (18+ YRS BOOSTER .25ML): CPT | Performed by: INTERNAL MEDICINE

## 2022-01-14 PROCEDURE — 84443 ASSAY THYROID STIM HORMONE: CPT | Performed by: INTERNAL MEDICINE

## 2022-01-14 PROCEDURE — 36415 COLL VENOUS BLD VENIPUNCTURE: CPT | Performed by: INTERNAL MEDICINE

## 2022-01-14 PROCEDURE — 99214 OFFICE O/P EST MOD 30 MIN: CPT | Mod: 25 | Performed by: INTERNAL MEDICINE

## 2022-01-14 PROCEDURE — 83036 HEMOGLOBIN GLYCOSYLATED A1C: CPT | Performed by: INTERNAL MEDICINE

## 2022-01-14 PROCEDURE — 82043 UR ALBUMIN QUANTITATIVE: CPT | Performed by: INTERNAL MEDICINE

## 2022-01-14 PROCEDURE — 82306 VITAMIN D 25 HYDROXY: CPT | Performed by: INTERNAL MEDICINE

## 2022-01-14 PROCEDURE — 83970 ASSAY OF PARATHORMONE: CPT | Performed by: INTERNAL MEDICINE

## 2022-01-14 PROCEDURE — G0438 PPPS, INITIAL VISIT: HCPCS | Performed by: INTERNAL MEDICINE

## 2022-01-14 PROCEDURE — 80048 BASIC METABOLIC PNL TOTAL CA: CPT | Performed by: INTERNAL MEDICINE

## 2022-01-14 PROCEDURE — 85018 HEMOGLOBIN: CPT | Performed by: INTERNAL MEDICINE

## 2022-01-14 PROCEDURE — 91306 COVID-19,PF,MODERNA (18+ YRS BOOSTER .25ML): CPT | Performed by: INTERNAL MEDICINE

## 2022-01-14 RX ORDER — LEVOTHYROXINE SODIUM 75 UG/1
75 TABLET ORAL DAILY
Qty: 90 TABLET | Refills: 3 | Status: SHIPPED | OUTPATIENT
Start: 2022-01-14 | End: 2022-02-07

## 2022-01-14 ASSESSMENT — MIFFLIN-ST. JEOR: SCORE: 1305.85

## 2022-01-14 ASSESSMENT — ENCOUNTER SYMPTOMS: BREAST MASS: 0

## 2022-01-14 ASSESSMENT — ACTIVITIES OF DAILY LIVING (ADL): CURRENT_FUNCTION: NO ASSISTANCE NEEDED

## 2022-01-14 NOTE — PROGRESS NOTES
"SUBJECTIVE:   Kiki Ibrahim is a 66 year old female who presents for Preventive Visit. And follow-up CKD, hypothyroidism, hyperlipidema      Patient has been advised of split billing requirements and indicates understanding: Yes   Are you in the first 12 months of your Medicare coverage?  No    Healthy Habits:     In general, how would you rate your overall health?  Good    Frequency of exercise:  4-5 days/week    Duration of exercise:  30-45 minutes    Do you usually eat at least 4 servings of fruit and vegetables a day, include whole grains    & fiber and avoid regularly eating high fat or \"junk\" foods?  Yes    Taking medications regularly:  Yes    Medication side effects:  None    Ability to successfully perform activities of daily living:  No assistance needed    Home Safety:  No safety concerns identified    Hearing Impairment:  No hearing concerns    In the past 6 months, have you been bothered by leaking of urine?  No    In general, how would you rate your overall mental or emotional health?  Good      PHQ-2 Total Score: 0    Additional concerns today:  Yes    Do you feel safe in your environment? Yes    Have you ever done Advance Care Planning? (For example, a Health Directive, POLST, or a discussion with a medical provider or your loved ones about your wishes): Yes, patient states has an Advance Care Planning document and will bring a copy to the clinic.       Fall risk  Fallen 2 or more times in the past year?: No  Any fall with injury in the past year?: No    Cognitive Screening   1) Repeat 3 items (Leader, Season, Table)    2) Clock draw: NORMAL  3) 3 item recall: Recalls 3 objects  Results: NORMAL clock, 3 items recalled: COGNITIVE IMPAIRMENT LESS LIKELY    Mini-CogTM Copyright LUCY Lockhart. Licensed by the author for use in [facility]; reprinted with permission (kristyn@.[facility]). All rights reserved.      Do you have sleep apnea, excessive snoring or daytime drowsiness?: no    Reviewed and " updated as needed this visit by clinical staff   Allergies  Meds             Reviewed and updated as needed this visit by Provider               Social History     Tobacco Use     Smoking status: Never Smoker     Smokeless tobacco: Never Used   Substance Use Topics     Alcohol use: Yes     Comment: occasional     If you drink alcohol do you typically have >3 drinks per day or >7 drinks per week? No    Alcohol Use 1/14/2022   Prescreen: >3 drinks/day or >7 drinks/week? Not Applicable   Prescreen: >3 drinks/day or >7 drinks/week? -           Current providers sharing in care for this patient include:   Patient Care Team:  Roberto Montoya MD as PCP - Otis Mendez MD as Assigned PCP    The following health maintenance items are reviewed in Epic and correct as of today:  Health Maintenance Due   Topic Date Due     MICROALBUMIN  Never done     ANNUAL REVIEW OF HM ORDERS  Never done     URINALYSIS  Never done     HEMOGLOBIN  06/13/2018     MEDICARE ANNUAL WELLNESS VISIT  11/14/2020     FALL RISK ASSESSMENT  11/14/2020     INFLUENZA VACCINE (1) Never done     COVID-19 Vaccine (2 - Booster for Randal series) 09/13/2021     BMP  11/16/2021     LIPID  11/16/2021     TSH W/FREE T4 REFLEX  11/16/2021     MAMMO SCREENING  11/16/2021     Labs reviewed in EPIC      Component      Latest Ref Rng & Units 9/21/2018 11/14/2019 12/11/2019 11/16/2020   Sodium      133 - 144 mmol/L 138 139  136   Potassium      3.4 - 5.3 mmol/L 4.1 3.9  4.1   Chloride      94 - 109 mmol/L 104 104  105   Carbon Dioxide      20 - 32 mmol/L 28 26  29   Anion Gap      3 - 14 mmol/L 6 9  2 (L)   Glucose      70 - 99 mg/dL 90 93  107 (H)   Urea Nitrogen      7 - 30 mg/dL 21 17  22   Creatinine      0.52 - 1.04 mg/dL 1.01 1.05 (H)  1.08 (H)   GFR Estimate      >60 mL/min/1.73:m2 55 (L) 55 (L)  53 (L)   GFR Estimate If Black      >60 mL/min/1.73:m2 67 64  62   Calcium      8.5 - 10.1 mg/dL 9.1 9.7  9.2   Bilirubin Total      0.2 - 1.3 mg/dL   0.5     Albumin      3.4 - 5.0 g/dL  4.0     Protein Total      6.8 - 8.8 g/dL  7.5     Alkaline Phosphatase      40 - 150 U/L  88     ALT      0 - 50 U/L  30     AST      0 - 45 U/L  19     Cholesterol      <200 mg/dL   251 (H) 267 (H)   Triglycerides      <150 mg/dL   58 84   HDL Cholesterol      >49 mg/dL   79 77   LDL Cholesterol Calculated      <100 mg/dL   160 (H) 173 (H)   Non HDL Cholesterol      <130 mg/dL   172 (H) 190 (H)   TSH      0.40 - 4.00 mU/L   3.32 1.15           Breast CA Risk Assessment (FHS-7) 1/14/2022   Do you have a family history of breast, colon, or ovarian cancer? No / Unknown         Mammogram Screening: Recommended mammography every 1-2 years with patient discussion and risk factor consideration  Pertinent mammograms are reviewed under the imaging tab.    Review of Systems   Breasts:  Negative for tenderness, breast mass and discharge.   Genitourinary: Negative for pelvic pain, vaginal bleeding and vaginal discharge.     CONSTITUTIONAL: NEGATIVE for fever, chills. Weight up 11 pounds in 2+ years  INTEGUMENTARY/SKIN: NEGATIVE for worrisome rashes, moles or lesions  EYES: NEGATIVE for vision changes or irritation. Has glasses.  Cataract surgery in Nov and Dec 2021  ENT/MOUTH: NEGATIVE for ear pain, mouth and throat problems. Occ tinnitus bilateral. One coffee in AM and occ Coke   RESP: NEGATIVE for significant cough or SOB  BREAST: NEGATIVE for masses, tenderness or discharge  CV: NEGATIVE for chest pain, palpitations or peripheral edema  GI: NEGATIVE for nausea, abdominal pain, heartburn, or change in bowel habits  : NEGATIVE for frequency, dysuria, or hematuria  MUSCULOSKELETAL: NEGATIVE for significant arthralgias or myalgia  NEURO: NEGATIVE for weakness, dizziness or paresthesias  ENDOCRINE: NEGATIVE for temperature intolerance, skin/hair changes  HEME: NEGATIVE for bleeding problems  PSYCHIATRIC: NEGATIVE for changes in mood or affect    OBJECTIVE:   /78   Pulse 78   Temp  "97.5  F (36.4  C) (Temporal)   Resp 16   Ht 1.613 m (5' 3.5\")   Wt 79.4 kg (175 lb)   LMP 08/24/2004   SpO2 98%   BMI 30.51 kg/m   Estimated body mass index is 29.9 kg/m  as calculated from the following:    Height as of 11/15/21: 1.613 m (5' 3.5\").    Weight as of 11/15/21: 77.8 kg (171 lb 8 oz).  Physical Exam  General appearance - healthy, alert, no distress  Skin - No rashes or lesions. Multiple small skin tags around the neck bilaterally and seborrheic keratoses on  Head - normocephalic, atraumatic  Eyes - TIM, EOMI, fundi exam with nondilated pupils negative.  Ears - External ears normal. Canals clear. TM's normal.  Nose/Sinuses - Nares normal. Septum midline. Mucosa normal. No drainage or sinus tenderness.  Oropharynx - No erythema, no adenopathy, no exudates.  Neck - Supple without adenopathy or thyromegaly. No bruits.  Lungs - Clear to auscultation without wheezes/rhonchi.  Heart - Regular rate and rhythm without murmurs, clicks, or gallops.  Nodes - No supraclavicular, axillary, or inguinal adenopathy palpable.  Breasts - deferred  Abdomen - Abdomen mildly obese, soft, non-tender. BS normal. No masses or hepatosplenomegaly palpable. No bruits.  Extremities -No cyanosis, clubbing or edema.    Musculoskeletal - Spine ROM normal. Muscular strength intact.   Peripheral pulses - radial=4/4, femoral=4/4, posterior tibial=4/4, dorsalis pedis=4/4,  Neuro - Gait normal. Reflexes normal and symmetric. Sensation grossly WNL.  Genital/Rectal - deferred      ASSESSMENT / PLAN:   1. Medicare annual wellness visit, initial  Due for COVID booster vaccine and mammogram.  Counseled regarding weight and exercise/diet.  Other health maintenance up-to-date    2. Chronic kidney disease, stage 3a (H)  Has been stable previously.  Counseled again regarding avoidance of NSAIDs.  Labs today as ordered  - Albumin Random Urine Quantitative with Creat Ratio; Future  - Hemoglobin; Future  - BASIC METABOLIC PANEL; Future  - " "Phosphorus; Future  - Parathyroid Hormone Intact; Future  - Vitamin D Deficiency; Future    3. Blood glucose elevated  Previous mild glucose elevation.  Labs today as ordered.  Counseled regarding carbohydrate reduction and increased walking exercise  - BASIC METABOLIC PANEL; Future  - Hemoglobin A1c; Future    4. Hypothyroidism, unspecified type  Previously controlled.  Continue levothyroxine.  Labs ordered  - TSH WITH FREE T4 REFLEX; Future  - levothyroxine (SYNTHROID/LEVOTHROID) 75 MCG tablet; Take 1 tablet (75 mcg) by mouth daily  Dispense: 90 tablet; Refill: 3    5. Hyperlipidemia LDL goal <100  Previous LDL elevation.  We will recheck today and calculate 10-year CAD risk determine if statin now indicated  - Lipid panel reflex to direct LDL Fasting; Future    6. High priority for 2019-nCoV vaccine  Due for COVID booster  - COVID-19,PF,MODERNA (18+ Yrs BOOSTER .25mL)    7. Encounter for screening mammogram for breast cancer  - MA SCREENING DIGITAL BILAT - Future  (s+30); Future    8. Asymptomatic menopausal state   History of osteopenia.  Needs follow-up.  History of intolerance to Fosamax.  If worsening, possible trial of Prolia  - DX Hip/Pelvis/Spine; Future      Patient has been advised of split billing requirements and indicates understanding: Yes     COUNSELING:  Reviewed preventive health counseling, as reflected in patient instructions    Estimated body mass index is 30.51 kg/m  as calculated from the following:    Height as of this encounter: 1.613 m (5' 3.5\").    Weight as of this encounter: 79.4 kg (175 lb).    Weight management plan: Discussed healthy diet and exercise guidelines    She reports that she has never smoked. She has never used smokeless tobacco.      Appropriate preventive services were discussed with this patient, including applicable screening as appropriate for cardiovascular disease, diabetes, osteopenia/osteoporosis, and glaucoma.  As appropriate for age/gender, discussed screening " for colorectal cancer, prostate cancer, breast cancer, and cervical cancer. Checklist reviewing preventive services available has been given to the patient.    Reviewed patients plan of care and provided an AVS. The Basic Care Plan (routine screening as documented in Health Maintenance) for Kiki meets the Care Plan requirement. This Care Plan has been established and reviewed with the Patient.    Counseling Resources:  ATP IV Guidelines  Pooled Cohorts Equation Calculator  Breast Cancer Risk Calculator  Breast Cancer: Medication to Reduce Risk  FRAX Risk Assessment  ICSI Preventive Guidelines  Dietary Guidelines for Americans, 2010  USDA's MyPlate  ASA Prophylaxis  Lung CA Screening    PLAN:  Continue current meds  Prescriptions refilled.     Avoid NSAIDS (Ibuprofen Advil or Aleve). OK for Tylenol/ Acetaminophen  Labs today as ordered  Reduce calorie/carbohydrate (sugar, bread, potato, pasta, rice, alcohol etc)  intake in diet.  Increase color on your plate with fruits and vegetables. Increase  frequency of walking or other aerobic exercise as able (goal is daily)  Vaccinations: Moderna covid vaccine  Mammogram  and Bone density/DEXA appt at [facility]  Pt was informed regarding extra E&M billing for management of new or established medical issues not related to today's wellness visit    Roberto Montoya MD  [facility]

## 2022-01-14 NOTE — PATIENT INSTRUCTIONS
Continue current meds  Prescriptions refilled.     Avoid NSAIDS (Ibuprofen Advil or Aleve). OK for Tylenol/ Acetaminophen  Labs today as ordered  Reduce calorie/carbohydrate (sugar, bread, potato, pasta, rice, alcohol etc)  intake in diet.  Increase color on your plate with fruits and vegetables. Increase  frequency of walking or other aerobic exercise as able (goal is daily)  Vaccinations: Moderna covid vaccine  Mammogram  and Bone density/DEXA appt at [facility]  Pt was informed regarding extra E&M billing for management of new or established medical issues not related to today's wellness visit

## 2022-01-28 ENCOUNTER — ANCILLARY PROCEDURE (OUTPATIENT)
Dept: MAMMOGRAPHY | Facility: CLINIC | Age: 67
End: 2022-01-28
Attending: INTERNAL MEDICINE
Payer: MEDICARE

## 2022-01-28 ENCOUNTER — ANCILLARY PROCEDURE (OUTPATIENT)
Dept: BONE DENSITY | Facility: CLINIC | Age: 67
End: 2022-01-28
Attending: INTERNAL MEDICINE
Payer: MEDICARE

## 2022-01-28 ENCOUNTER — TELEPHONE (OUTPATIENT)
Dept: INTERNAL MEDICINE | Facility: CLINIC | Age: 67
End: 2022-01-28

## 2022-01-28 DIAGNOSIS — Z78.0 ASYMPTOMATIC MENOPAUSAL STATE: ICD-10-CM

## 2022-01-28 DIAGNOSIS — Z12.31 VISIT FOR SCREENING MAMMOGRAM: ICD-10-CM

## 2022-01-28 DIAGNOSIS — Z12.31 ENCOUNTER FOR SCREENING MAMMOGRAM FOR BREAST CANCER: ICD-10-CM

## 2022-01-28 PROCEDURE — 77063 BREAST TOMOSYNTHESIS BI: CPT | Mod: TC | Performed by: RADIOLOGY

## 2022-01-28 PROCEDURE — 77067 SCR MAMMO BI INCL CAD: CPT | Mod: TC | Performed by: RADIOLOGY

## 2022-01-28 PROCEDURE — 77080 DXA BONE DENSITY AXIAL: CPT | Performed by: INTERNAL MEDICINE

## 2022-02-04 DIAGNOSIS — E03.9 HYPOTHYROIDISM, UNSPECIFIED TYPE: ICD-10-CM

## 2022-02-04 RX ORDER — LEVOTHYROXINE SODIUM 75 UG/1
TABLET ORAL
Qty: 90 TABLET | Refills: 3 | OUTPATIENT
Start: 2022-02-04

## 2022-02-07 RX ORDER — LEVOTHYROXINE SODIUM 75 UG/1
75 TABLET ORAL DAILY
Qty: 90 TABLET | Refills: 3 | Status: SHIPPED | OUTPATIENT
Start: 2022-02-07 | End: 2023-01-28

## 2022-02-07 NOTE — TELEPHONE ENCOUNTER
Patient calling to inform that [facility] does not have the 1/14/22 prescription for levothyroxine.  Prescription approved per [facility] Refill Protocol.  Jocelyn Duenas RN

## 2023-01-27 DIAGNOSIS — N18.31 CHRONIC KIDNEY DISEASE, STAGE 3A (H): ICD-10-CM

## 2023-01-27 DIAGNOSIS — E03.9 HYPOTHYROIDISM, UNSPECIFIED TYPE: ICD-10-CM

## 2023-01-27 DIAGNOSIS — E78.5 HYPERLIPIDEMIA LDL GOAL <100: Primary | ICD-10-CM

## 2023-01-27 DIAGNOSIS — Z13.1 SCREENING FOR DIABETES MELLITUS: ICD-10-CM

## 2023-01-28 RX ORDER — LEVOTHYROXINE SODIUM 75 UG/1
TABLET ORAL
Qty: 90 TABLET | Refills: 0 | Status: SHIPPED | OUTPATIENT
Start: 2023-01-28 | End: 2023-04-11

## 2023-01-28 NOTE — TELEPHONE ENCOUNTER
Patient last seen a year ago.  Due for follow-up fasting labs and follow-up appointment with MD. call patient and assist in scheduling appointment with me in clinic preceded by a fasting lab appointment in the 1 week prior to lab results can be reviewed with patient when seen back in clinic.  Future labs ordered.  Medication refilled for 90 days

## 2023-04-11 ENCOUNTER — OFFICE VISIT (OUTPATIENT)
Dept: INTERNAL MEDICINE | Facility: CLINIC | Age: 68
End: 2023-04-11
Payer: MEDICARE

## 2023-04-11 ENCOUNTER — ANCILLARY PROCEDURE (OUTPATIENT)
Dept: MAMMOGRAPHY | Facility: CLINIC | Age: 68
End: 2023-04-11
Attending: INTERNAL MEDICINE
Payer: MEDICARE

## 2023-04-11 VITALS
OXYGEN SATURATION: 100 % | WEIGHT: 172.6 LBS | TEMPERATURE: 97.9 F | DIASTOLIC BLOOD PRESSURE: 68 MMHG | SYSTOLIC BLOOD PRESSURE: 114 MMHG | BODY MASS INDEX: 30.58 KG/M2 | HEIGHT: 63 IN | HEART RATE: 81 BPM

## 2023-04-11 DIAGNOSIS — Z00.00 MEDICARE ANNUAL WELLNESS VISIT, SUBSEQUENT: Primary | ICD-10-CM

## 2023-04-11 DIAGNOSIS — Z12.31 VISIT FOR SCREENING MAMMOGRAM: ICD-10-CM

## 2023-04-11 DIAGNOSIS — Z13.6 CARDIOVASCULAR SCREENING; LDL GOAL LESS THAN 100: ICD-10-CM

## 2023-04-11 DIAGNOSIS — M85.80 OSTEOPENIA, UNSPECIFIED LOCATION: ICD-10-CM

## 2023-04-11 DIAGNOSIS — Z13.1 SCREENING FOR DIABETES MELLITUS: ICD-10-CM

## 2023-04-11 DIAGNOSIS — E03.9 HYPOTHYROIDISM, UNSPECIFIED TYPE: ICD-10-CM

## 2023-04-11 PROBLEM — N18.31 CHRONIC KIDNEY DISEASE, STAGE 3A (H): Status: RESOLVED | Noted: 2021-11-15 | Resolved: 2023-04-11

## 2023-04-11 LAB
ALBUMIN SERPL BCG-MCNC: 4.3 G/DL (ref 3.5–5.2)
ALP SERPL-CCNC: 96 U/L (ref 35–104)
ALT SERPL W P-5'-P-CCNC: 32 U/L (ref 10–35)
ANION GAP SERPL CALCULATED.3IONS-SCNC: 13 MMOL/L (ref 7–15)
AST SERPL W P-5'-P-CCNC: 30 U/L (ref 10–35)
BILIRUB SERPL-MCNC: 0.3 MG/DL
BUN SERPL-MCNC: 19.1 MG/DL (ref 8–23)
CALCIUM SERPL-MCNC: 9.6 MG/DL (ref 8.8–10.2)
CHLORIDE SERPL-SCNC: 105 MMOL/L (ref 98–107)
CHOLEST SERPL-MCNC: 274 MG/DL
CREAT SERPL-MCNC: 1.18 MG/DL (ref 0.51–0.95)
DEPRECATED HCO3 PLAS-SCNC: 23 MMOL/L (ref 22–29)
GFR SERPL CREATININE-BSD FRML MDRD: 50 ML/MIN/1.73M2
GLUCOSE SERPL-MCNC: 103 MG/DL (ref 70–99)
HDLC SERPL-MCNC: 77 MG/DL
LDLC SERPL CALC-MCNC: 186 MG/DL
NONHDLC SERPL-MCNC: 197 MG/DL
POTASSIUM SERPL-SCNC: 4.8 MMOL/L (ref 3.4–5.3)
PROT SERPL-MCNC: 7.2 G/DL (ref 6.4–8.3)
SODIUM SERPL-SCNC: 141 MMOL/L (ref 136–145)
TRIGL SERPL-MCNC: 55 MG/DL
TSH SERPL DL<=0.005 MIU/L-ACNC: 0.96 UIU/ML (ref 0.3–4.2)

## 2023-04-11 PROCEDURE — 84443 ASSAY THYROID STIM HORMONE: CPT | Performed by: INTERNAL MEDICINE

## 2023-04-11 PROCEDURE — 77067 SCR MAMMO BI INCL CAD: CPT | Mod: TC | Performed by: RADIOLOGY

## 2023-04-11 PROCEDURE — 36415 COLL VENOUS BLD VENIPUNCTURE: CPT | Performed by: INTERNAL MEDICINE

## 2023-04-11 PROCEDURE — 80061 LIPID PANEL: CPT | Performed by: INTERNAL MEDICINE

## 2023-04-11 PROCEDURE — G0439 PPPS, SUBSEQ VISIT: HCPCS | Performed by: INTERNAL MEDICINE

## 2023-04-11 PROCEDURE — 80053 COMPREHEN METABOLIC PANEL: CPT | Performed by: INTERNAL MEDICINE

## 2023-04-11 PROCEDURE — 77063 BREAST TOMOSYNTHESIS BI: CPT | Mod: TC | Performed by: RADIOLOGY

## 2023-04-11 RX ORDER — LEVOTHYROXINE SODIUM 75 UG/1
75 TABLET ORAL DAILY
Qty: 90 TABLET | Refills: 3 | Status: SHIPPED | OUTPATIENT
Start: 2023-04-11 | End: 2024-04-05

## 2023-04-11 ASSESSMENT — ENCOUNTER SYMPTOMS
WEAKNESS: 0
JOINT SWELLING: 0
DIZZINESS: 0
ARTHRALGIAS: 0
HEARTBURN: 0
NERVOUS/ANXIOUS: 0
DYSURIA: 0
FREQUENCY: 0
PARESTHESIAS: 0
NAUSEA: 0
DIARRHEA: 0
FEVER: 0
CONSTIPATION: 0
PALPITATIONS: 0
MYALGIAS: 0
HEMATURIA: 0
BREAST MASS: 0
HEMATOCHEZIA: 0
SORE THROAT: 0
SHORTNESS OF BREATH: 0
COUGH: 0
HEADACHES: 0
CHILLS: 0
EYE PAIN: 0
ABDOMINAL PAIN: 0

## 2023-04-11 ASSESSMENT — ACTIVITIES OF DAILY LIVING (ADL): CURRENT_FUNCTION: NO ASSISTANCE NEEDED

## 2023-04-11 NOTE — PATIENT INSTRUCTIONS
Continue current medications  Prescriptions refilled.    Labs today as ordered  Reduce calorie/carbohydrate (sugar, bread, potato, pasta, rice, alcohol etc)  intake in diet.  Increase color on your plate with vegetables.  Conmtinue frequency of walking or other aerobic exercise as able (goal is daily)  Mammogram. This will be done at the [facility]. Call [phone]  to schedule.    I would recommend a covid booster vaccination. You may have it done at any pharmacy. If you wish to have it done at a [facility] pharmacy, then go to [email or web address] to schedule a vaccination appointment  Pt was informed regarding extra E&M billing for management of new or established medical issues not related to today's wellness/screening visit

## 2023-04-11 NOTE — PROGRESS NOTES
"SUBJECTIVE:   Kiki is a 67 year old who presents for Preventive Visit and follow-up hypothyroidism, osteopenia    Patient has been advised of split billing requirements and indicates understanding: Yes  Are you in the first 12 months of your Medicare coverage?  No    Healthy Habits:     In general, how would you rate your overall health?  Good    Frequency of exercise:  4-5 days/week    Duration of exercise:  15-30 minutes    Do you usually eat at least 4 servings of fruit and vegetables a day, include whole grains    & fiber and avoid regularly eating high fat or \"junk\" foods?  Yes    Taking medications regularly:  Yes    Medication side effects:  None    Ability to successfully perform activities of daily living:  No assistance needed    Home Safety:  No safety concerns identified    Hearing Impairment:  No hearing concerns    In the past 6 months, have you been bothered by leaking of urine?  No    In general, how would you rate your overall mental or emotional health?  Good      PHQ-2 Total Score: 0    Additional concerns today:  Yes      Have you ever done Advance Care Planning? (For example, a Health Directive, POLST, or a discussion with a medical provider or your loved ones about your wishes): Yes, advance care planning is on file.      Fall risk  Fallen 2 or more times in the past year?: No  Any fall with injury in the past year?: No    Cognitive Screening   1) Repeat 3 items (Leader, Season, Table)    2) Clock draw: NORMAL  3) 3 item recall: Recalls 3 objects  Results: 3 items recalled: COGNITIVE IMPAIRMENT LESS LIKELY    Mini-CogTM Copyright LUCY Lockhart. Licensed by the author for use in [facility]; reprinted with permission (kristyn@.[email or web address]). All rights reserved.      Do you have sleep apnea, excessive snoring or daytime drowsiness?: no    Reviewed and updated as needed this visit by clinical staff                  Reviewed and updated as needed this visit by Provider                 Social " History     Tobacco Use     Smoking status: Never     Smokeless tobacco: Never   Vaping Use     Vaping status: Not on file   Substance Use Topics     Alcohol use: Yes     Comment: occasional              4/11/2023     7:02 AM   Alcohol Use   Prescreen: >3 drinks/day or >7 drinks/week? No      Do you have a current opioid prescription? No  Do you use any other controlled substances or medications that are not prescribed by a provider? None              Current providers sharing in care for this patient include:   Patient Care Team:  Roberto Montoya MD as PCP - General  Roberto Montoya MD as Assigned PCP    The following health maintenance items are reviewed in Epic and correct as of today:  Health Maintenance   Topic Date Due     URINALYSIS  Never done     COVID-19 Vaccine (3 - Booster for Randal series) 03/11/2022     INFLUENZA VACCINE (1) Never done     MEDICARE ANNUAL WELLNESS VISIT  01/14/2023     BMP  01/14/2023     LIPID  01/14/2023     MICROALBUMIN  01/14/2023     TSH W/FREE T4 REFLEX  01/14/2023     ANNUAL REVIEW OF HM ORDERS  01/14/2023     MAMMO SCREENING  01/28/2023     HEMOGLOBIN  01/14/2023     FALL RISK ASSESSMENT  04/11/2024     ADVANCE CARE PLANNING  01/14/2027     COLORECTAL CANCER SCREENING  11/04/2027     DTAP/TDAP/TD IMMUNIZATION (3 - Td or Tdap) 11/17/2030     DEXA  01/28/2037     HEPATITIS C SCREENING  Completed     PHQ-2 (once per calendar year)  Completed     Pneumococcal Vaccine: 65+ Years  Completed     ZOSTER IMMUNIZATION  Completed     IPV IMMUNIZATION  Aged Out     MENINGITIS IMMUNIZATION  Aged Out     Labs reviewed in [facility]          1/14/2022     7:48 AM 1/28/2022     8:58 AM   Breast CA Risk Assessment (FHS-7)   Do you have a family history of breast, colon, or ovarian cancer? No / Unknown No / Unknown         Mammogram Screening: Recommended mammography every 1-2 years with patient discussion and risk factor consideration  Pertinent mammograms are reviewed under the imaging tab.    Review  "of Systems   Constitutional: Negative for chills and fever.   HENT: Negative for congestion, ear pain, hearing loss and sore throat.    Eyes: Negative for pain and visual disturbance.   Respiratory: Negative for cough and shortness of breath.    Cardiovascular: Negative for chest pain, palpitations and peripheral edema.   Gastrointestinal: Negative for abdominal pain, constipation, diarrhea, heartburn, hematochezia and nausea.   Breasts:  Negative for tenderness, breast mass and discharge.   Genitourinary: Negative for dysuria, frequency, genital sores, hematuria, pelvic pain, urgency, vaginal bleeding and vaginal discharge.   Musculoskeletal: Negative for arthralgias, joint swelling and myalgias.   Skin: Negative for rash.   Neurological: Negative for dizziness, weakness, headaches and paresthesias.   Psychiatric/Behavioral: Negative for mood changes. The patient is not nervous/anxious.      Weight down 3 pounds   HAs glasses. Eye exam scheduled for May     OBJECTIVE:   /68   Pulse 81   Temp 97.9  F (36.6  C) (Temporal)   Ht 1.607 m (5' 3.25\")   Wt 78.3 kg (172 lb 9.6 oz)   LMP 08/24/2004   SpO2 100%   BMI 30.33 kg/m   Estimated body mass index is 30.51 kg/m  as calculated from the following:    Height as of 1/14/22: 1.613 m (5' 3.5\").    Weight as of 1/14/22: 79.4 kg (175 lb).  Physical Exam  General appearance -   alert, no distress  Skin - No rashes or lesions. Multiple seborrheic keratosis on trunk  \Head - normocephalic, atraumatic  Eyes - TIM, EOMI, fundi exam with nondilated pupils negative.  Ears - External ears normal. Canals clear. TM's normal.  Nose/Sinuses - Nares normal. Septum midline. Mucosa normal. No drainage or sinus tenderness.  Oropharynx - No erythema, no adenopathy, no exudates.  Neck - Supple without adenopathy or thyromegaly. No bruits.  Lungs - Clear to auscultation without wheezes/rhonchi.  Heart - Regular rate and rhythm without murmurs, clicks, or gallops.  Nodes - No " supraclavicular, axillary, or inguinal adenopathy palpable.  Breasts - deferred  Abdomen - Abdomen obese, soft, non-tender. BS normal. No masses or hepatosplenomegaly palpable. No bruits.  Extremities -No cyanosis, clubbing or edema.    Musculoskeletal - Spine ROM normal. Muscular strength intact.   Peripheral pulses - radial=4/4, femoral=4/4, posterior tibial=4/4, dorsalis pedis=4/4,  Neuro - Gait normal. Reflexes normal and symmetric. Sensation grossly WNL.  Genital/Rectal - deferred          ASSESSMENT / PLAN:   1. Medicare annual wellness visit, subsequent  Discussed recommendation for COVID booster vaccination.  Patient declines today but will consider.  Screening labs as ordered below.  Counseled regarding calorie/carbohydrate reduction and continuing exercise for weight loss  - PRIMARY CARE FOLLOW-UP SCHEDULING; Future  - Comprehensive metabolic panel; Future    2. Hypothyroidism, unspecified type  Previously controlled.  Continue current medication.  Labs as ordered  - TSH with free T4 reflex; Future  - levothyroxine (SYNTHROID/LEVOTHROID) 75 MCG tablet; Take 1 tablet (75 mcg) by mouth daily  Dispense: 90 tablet; Refill: 3    3. Osteopenia, unspecified location  DEXA done last year and not requiring prescription therapy.  Will repeat 2025    4. Visit for screening mammogram  Candidate for breast cancer screening  - MA SCREENING DIGITAL BILAT - Future  (s+30); Future    5. CARDIOVASCULAR SCREENING; LDL GOAL LESS THAN 100  Candidate for screening  - Lipid panel reflex to direct LDL Fasting; Future    6. Screening for diabetes mellitus  Candidate for screening  - Comprehensive metabolic panel; Future      Patient has been advised of split billing requirements and indicates understanding: Yes      COUNSELING:  Reviewed preventive health counseling, as reflected in patient instructions        She reports that she has never smoked. She has never used smokeless tobacco.      Appropriate preventive services were  discussed with this patient, including applicable screening as appropriate for cardiovascular disease, diabetes, osteopenia/osteoporosis, and glaucoma.  As appropriate for age/gender, discussed screening for colorectal cancer, prostate cancer, breast cancer, and cervical cancer. Checklist reviewing preventive services available has been given to the patient.    Reviewed patients plan of care and provided an AVS. The Basic Care Plan (routine screening as documented in Health Maintenance) for Kiki meets the Care Plan requirement. This Care Plan has been established and reviewed with the Patient.        PLAN:  Continue current medications  Prescriptions refilled.    Labs today as ordered  Reduce calorie/carbohydrate (sugar, bread, potato, pasta, rice, alcohol etc)  intake in diet.  Increase color on your plate with vegetables.  Conmtinue frequency of walking or other aerobic exercise as able (goal is daily)  Mammogram. This will be done at the [facility]. Call [phone]  to schedule.    I would recommend a covid booster vaccination. You may have it done at any pharmacy. If you wish to have it done at a [facility] pharmacy, then go to [email or web address] to schedule a vaccination appointment  Pt was informed regarding extra E&M billing for management of new or established medical issues not related to today's wellness/screening visit    Roberto Montoya MD  [facility]    Identified Health Risks:    I have reviewed Opioid Use Disorder and Substance Use Disorder risk factors. NA

## 2024-01-26 ENCOUNTER — HOSPITAL ENCOUNTER (EMERGENCY)
Facility: CLINIC | Age: 69
Discharge: HOME | End: 2024-01-26
Payer: MEDICARE

## 2024-01-26 ENCOUNTER — APPOINTMENT (OUTPATIENT)
Dept: CT IMAGING | Facility: CLINIC | Age: 69
End: 2024-01-26
Attending: EMERGENCY MEDICINE
Payer: MEDICARE

## 2024-01-26 VITALS
TEMPERATURE: 97.9 F | OXYGEN SATURATION: 96 % | HEIGHT: 63 IN | RESPIRATION RATE: 16 BRPM | HEART RATE: 87 BPM | DIASTOLIC BLOOD PRESSURE: 64 MMHG | BODY MASS INDEX: 30.12 KG/M2 | SYSTOLIC BLOOD PRESSURE: 160 MMHG | WEIGHT: 170 LBS
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
## 2024-01-26 DIAGNOSIS — S09.90XA INJURY OF HEAD, INITIAL ENCOUNTER: ICD-10-CM

## 2024-01-26 PROCEDURE — 99284 EMERGENCY DEPT VISIT MOD MDM: CPT | Mod: 25

## 2024-01-26 PROCEDURE — 70450 CT HEAD/BRAIN W/O DYE: CPT | Mod: ME

## 2024-01-26 PROCEDURE — 250N000013 HC RX MED GY IP 250 OP 250 PS 637

## 2024-01-26 RX ORDER — ACETAMINOPHEN 325 MG/1
650 TABLET ORAL ONCE
Status: COMPLETED | OUTPATIENT
Start: 2024-01-26 | End: 2024-01-26

## 2024-01-26 RX ADMIN — ACETAMINOPHEN 650 MG: 325 TABLET, FILM COATED ORAL at 22:12

## 2024-01-27 NOTE — ED PROVIDER NOTES
History     Chief Complaint:  Fall       HPI   Kiki Ibrahim is a 68 year old female came into the ED in the care of her  tonight for evaluation of a head injury after a fall.  The patient states at 7:20 PM she tripped and fell on some uneven asphalt outside.  She landed on her right side and then struck the right side of her head.  She did not lose consciousness.  Mentions a mild headache.  She denies any blurry or double vision.  No nausea or vomiting.  No weakness in any extremities.  No speech changes.  No confusion.  She does not take any blood thinners.  She has had no trouble moving her eyes.  No loose or missing teeth.  No wounds or lacerations.  Denies any neck pain.  She has been walking without difficulty and moving her arms without difficulty.  Denies any shortness of breath or chest pain.  Denies any back pain.  States overall she just feels a bit tight and sore.      Independent Historian:    Patient and her  supply the history    Review of External Notes:  None    Medications:    Cholecalciferol (VITAMIN D) 2000 UNITS tablet  levothyroxine (SYNTHROID/LEVOTHROID) 75 MCG tablet  MULTIVITAMIN TABS   OR        Past Medical History:    Past Medical History:   Diagnosis Date    Colon polyps  2015    Hyperlipidemia LDL goal <160      Lateral epicondylitis  of elbow     Malignant neoplasm of breast (female), unspecified site     OSTEOPENIA     Unspecified hypothyroidism     Vitamin D deficiency 3/20/2013       Past Surgical History:    Past Surgical History:   Procedure Laterality Date    ZZC APPENDECTOMY      ZZC  DELIVERY ONLY      , Low Cervical X 2    Z LIGATE FALLOPIAN TUBE,POSTPARTUM      Tubal Ligation    Z NONSPECIFIC PROCEDURE       Left breast lumpectomy with sentinel node bx          Physical Exam   Patient Vitals for the past 24 hrs:   BP Temp Temp src Pulse Resp SpO2 Height Weight   24 2040 (!) 160/64 97.9  F (36.6  C) Temporal 87 16 96  "% -- --   01/26/24 2036 -- -- -- -- -- -- 1.6 m (5' 3\") 77.1 kg (170 lb)        Physical Exam  General: Adult female sitting in exam room.  HENT:   Ears: No hemotympanum.  Head: Atraumatic.  No raccoon eyes or Bhatt sign.  Patient has minimal to slight swelling over the right cheek with mild tenderness to palpation.  No nasal bone deformity.  Mouth/Throat: Oropharynx clear and moist.  Loose or missing dentition.  Patient is able to bite down on a tongue depressor with both sides of her mouth without difficulty.  No difficulty opening or closing the mouth.  Eyes: Conjunctive and EOM normal. PERRLA.  Neck: Normal ROM. No rigidity.  No midline C-spine tenderness.  CV: Regular rate and rhythm. Normal S1, S2. No appreciable murmurs.  Resp: Trachea midline.  Lungs clear to auscultation bilaterally. Normal respiratory effort.  GI: Abdomen soft, non distended and nontender. No rebound or guarding.  MSK: Patient moving all extremities without difficulty.  Patient can ambulate back and forth in the room without difficulty.  No midline T-spine or L-spine tenderness.  No tenderness over the anterior chest wall or clavicles.  Skin: Warm and dry.  No wounds or ecchymosis.  Neuro: Awake, alert, oriented x 3.  GCS 15.  Cranial nerves II through XII intact.  Normal finger-nose-finger.  Normal heel-to-shin.  Normal and equal strength in all extremities.  Normal and symmetric finger .  Normal and steady gait.  Psych: Normal mood and affect.      Emergency Department Course     Imaging:  Head CT w/o contrast   Final Result   IMPRESSION:     1.  No evidence of acute intracranial hemorrhage or mass effect.        Report per radiology        Emergency Department Course & Assessments:      Interventions:  Medications   acetaminophen (TYLENOL) tablet 650 mg (650 mg Oral $Given 1/26/24 5948)        Assessments:  10:15pm I saw and evaluated the patient.    Independent Interpretation (X-rays, CTs, rhythm strip):  CT with no concerning " evidence for bleed.      Social Determinants of Health affecting care:  None     Disposition:  The patient was discharged to home.     Impression & Plan        Medical Decision Making:  Kiki is a pleasant and otherwise healthy 68-year-old female who came into the ED [facility] for evaluation of a head injury after a fall per HPI above.  On arrival, she is GCS 15 and neurologically intact without any focal deficits.  She did not lose consciousness, has no nausea or vomiting, is not altered and has only a slight headache.  No thinners.  Due to advanced age, CT scan obtained in triage and negative for any bleed.  Patient did not have any significant facial injuries on exam and was able to open and close the mouth without difficulty, had no loose or missing dentition, no abnormal extraocular movements and no nasal bone deformities.  Therefore, did not pursue CT facial bone study.  C-spine is cleared clinically and no indication for advanced imaging of the neck.  Patient's head to toe trauma exam was unremarkable otherwise per above.  She is moving all extremities without difficulty and has a steady gait back and forth in the room.  She was given Tylenol for her slight headache here.  Red flag reasons to return to an emergency department were discussed at the time of discharge and provided to her in writing.  She understands the importance of not getting a serial head injury.  She will call her PCP to schedule close recheck and evaluation.  Patient was discharged home in the care of her  neurologically intact and well-appearing.  She felt reassured about her CT results.        Diagnosis:    ICD-10-CM    1. Injury of head, initial encounter  S09.90XA       2. Fall, initial encounter  W19.XXXA              1/26/2024   Shawna Copeland PA-C Dewing, Jennifer C, PA-C  01/26/24 1394

## 2024-01-27 NOTE — ED TRIAGE NOTES
Pt reports trip and fall outside landing on right side. Pt reports right side of face hit the hardest - some bruising noted. Denies loc or bloodthinners. C/o headache. Abrasions to right arm and hand.     Triage Assessment (Adult)       Row Name 01/26/24 2037          Respiratory WDL    Respiratory WDL WDL        Cardiac WDL    Cardiac WDL WDL        Cognitive/Neuro/Behavioral WDL    Cognitive/Neuro/Behavioral WDL WDL

## 2024-04-04 DIAGNOSIS — E03.9 HYPOTHYROIDISM, UNSPECIFIED TYPE: ICD-10-CM

## 2024-04-05 RX ORDER — LEVOTHYROXINE SODIUM 75 UG/1
75 TABLET ORAL DAILY
Qty: 90 TABLET | Refills: 0 | Status: SHIPPED | OUTPATIENT
Start: 2024-04-05 | End: 2024-07-06

## 2024-07-03 DIAGNOSIS — E78.5 HYPERLIPIDEMIA LDL GOAL <100: ICD-10-CM

## 2024-07-03 DIAGNOSIS — E03.9 HYPOTHYROIDISM, UNSPECIFIED TYPE: ICD-10-CM

## 2024-07-03 DIAGNOSIS — E55.9 VITAMIN D DEFICIENCY: ICD-10-CM

## 2024-07-03 DIAGNOSIS — R73.9 BLOOD GLUCOSE ELEVATED: Primary | ICD-10-CM

## 2024-07-03 DIAGNOSIS — N18.31 STAGE 3A CHRONIC KIDNEY DISEASE (H): ICD-10-CM

## 2024-07-06 RX ORDER — LEVOTHYROXINE SODIUM 75 UG/1
TABLET ORAL
Qty: 90 TABLET | Refills: 0 | Status: SHIPPED | OUTPATIENT
Start: 2024-07-06 | End: 2024-08-02

## 2024-07-06 NOTE — TELEPHONE ENCOUNTER
Pt last seen > 1 year ago and due for lab/clinic follow-up.  Med refilled for 90 days. Call pt and assist with scheduling fasting lab appt in the next  8 weeks followed by appt with me  in clinic  a few days later to review results and follow-up on medical issues May use open [facility] appt slot. Assist with scheduling appts

## 2024-07-08 NOTE — TELEPHONE ENCOUNTER
Scheduled pt for 7/29 lab and 8/2 appt with Dr. Montoya. Informed pt their prescription is filled for 90 days   Caroline Ashley [facility]

## 2024-07-29 ENCOUNTER — LAB (OUTPATIENT)
Dept: LAB | Facility: CLINIC | Age: 69
End: 2024-07-29
Payer: MEDICARE

## 2024-07-29 DIAGNOSIS — E78.5 HYPERLIPIDEMIA LDL GOAL <100: ICD-10-CM

## 2024-07-29 DIAGNOSIS — E03.9 HYPOTHYROIDISM, UNSPECIFIED TYPE: ICD-10-CM

## 2024-07-29 DIAGNOSIS — E55.9 VITAMIN D DEFICIENCY: ICD-10-CM

## 2024-07-29 DIAGNOSIS — N18.31 STAGE 3A CHRONIC KIDNEY DISEASE (H): ICD-10-CM

## 2024-07-29 DIAGNOSIS — R73.9 BLOOD GLUCOSE ELEVATED: ICD-10-CM

## 2024-07-29 LAB
ALBUMIN SERPL BCG-MCNC: 4.4 G/DL (ref 3.5–5.2)
ALP SERPL-CCNC: 85 U/L (ref 40–150)
ALT SERPL W P-5'-P-CCNC: 31 U/L (ref 0–50)
ANION GAP SERPL CALCULATED.3IONS-SCNC: 11 MMOL/L (ref 7–15)
AST SERPL W P-5'-P-CCNC: 31 U/L (ref 0–45)
BILIRUB SERPL-MCNC: 0.2 MG/DL
BUN SERPL-MCNC: 17.9 MG/DL (ref 8–23)
CALCIUM SERPL-MCNC: 9.3 MG/DL (ref 8.8–10.4)
CHLORIDE SERPL-SCNC: 105 MMOL/L (ref 98–107)
CHOLEST SERPL-MCNC: 266 MG/DL
CREAT SERPL-MCNC: 1.09 MG/DL (ref 0.51–0.95)
CREAT UR-MCNC: 123 MG/DL
EGFRCR SERPLBLD CKD-EPI 2021: 54 ML/MIN/1.73M2
ERYTHROCYTE [DISTWIDTH] IN BLOOD BY AUTOMATED COUNT: 12.9 % (ref 10–15)
FASTING STATUS PATIENT QL REPORTED: YES
FASTING STATUS PATIENT QL REPORTED: YES
GLUCOSE SERPL-MCNC: 115 MG/DL (ref 70–99)
HBA1C MFR BLD: 5.4 % (ref 0–5.6)
HCO3 SERPL-SCNC: 22 MMOL/L (ref 22–29)
HCT VFR BLD AUTO: 41.5 % (ref 35–47)
HDLC SERPL-MCNC: 76 MG/DL
HGB BLD-MCNC: 13.5 G/DL (ref 11.7–15.7)
LDLC SERPL CALC-MCNC: 175 MG/DL
MCH RBC QN AUTO: 29.6 PG (ref 26.5–33)
MCHC RBC AUTO-ENTMCNC: 32.5 G/DL (ref 31.5–36.5)
MCV RBC AUTO: 91 FL (ref 78–100)
MICROALBUMIN UR-MCNC: <12 MG/L
MICROALBUMIN/CREAT UR: NORMAL MG/G{CREAT}
NONHDLC SERPL-MCNC: 190 MG/DL
PHOSPHATE SERPL-MCNC: 3.3 MG/DL (ref 2.5–4.5)
PLATELET # BLD AUTO: 318 10E3/UL (ref 150–450)
POTASSIUM SERPL-SCNC: 4.5 MMOL/L (ref 3.4–5.3)
PROT SERPL-MCNC: 7.2 G/DL (ref 6.4–8.3)
PTH-INTACT SERPL-MCNC: 52 PG/ML (ref 15–65)
RBC # BLD AUTO: 4.56 10E6/UL (ref 3.8–5.2)
SODIUM SERPL-SCNC: 138 MMOL/L (ref 135–145)
TRIGL SERPL-MCNC: 76 MG/DL
TSH SERPL DL<=0.005 MIU/L-ACNC: 0.77 UIU/ML (ref 0.3–4.2)
VIT D+METAB SERPL-MCNC: 31 NG/ML (ref 20–50)
WBC # BLD AUTO: 5.5 10E3/UL (ref 4–11)

## 2024-07-29 PROCEDURE — 84100 ASSAY OF PHOSPHORUS: CPT

## 2024-07-29 PROCEDURE — 82043 UR ALBUMIN QUANTITATIVE: CPT

## 2024-07-29 PROCEDURE — 84443 ASSAY THYROID STIM HORMONE: CPT

## 2024-07-29 PROCEDURE — 85027 COMPLETE CBC AUTOMATED: CPT

## 2024-07-29 PROCEDURE — 83970 ASSAY OF PARATHORMONE: CPT

## 2024-07-29 PROCEDURE — 36415 COLL VENOUS BLD VENIPUNCTURE: CPT

## 2024-07-29 PROCEDURE — 82570 ASSAY OF URINE CREATININE: CPT

## 2024-07-29 PROCEDURE — 83036 HEMOGLOBIN GLYCOSYLATED A1C: CPT

## 2024-07-29 PROCEDURE — 80053 COMPREHEN METABOLIC PANEL: CPT

## 2024-07-29 PROCEDURE — 82306 VITAMIN D 25 HYDROXY: CPT

## 2024-07-29 PROCEDURE — 80061 LIPID PANEL: CPT

## 2024-07-30 NOTE — RESULT ENCOUNTER NOTE
Results reviewed. Will discuss them soon with pt at upcoming scheduled appt 8/2/2024.  See clinic note from that visit for future plan

## 2024-08-02 ENCOUNTER — OFFICE VISIT (OUTPATIENT)
Dept: INTERNAL MEDICINE | Facility: CLINIC | Age: 69
End: 2024-08-02
Payer: MEDICARE

## 2024-08-02 VITALS
SYSTOLIC BLOOD PRESSURE: 136 MMHG | WEIGHT: 170.5 LBS | BODY MASS INDEX: 30.21 KG/M2 | DIASTOLIC BLOOD PRESSURE: 76 MMHG | TEMPERATURE: 97.8 F | HEART RATE: 88 BPM | OXYGEN SATURATION: 100 % | HEIGHT: 63 IN

## 2024-08-02 DIAGNOSIS — M85.80 OSTEOPENIA, UNSPECIFIED LOCATION: ICD-10-CM

## 2024-08-02 DIAGNOSIS — Z00.00 MEDICARE ANNUAL WELLNESS VISIT, SUBSEQUENT: ICD-10-CM

## 2024-08-02 DIAGNOSIS — E03.9 HYPOTHYROIDISM, UNSPECIFIED TYPE: ICD-10-CM

## 2024-08-02 DIAGNOSIS — Z82.49 FAMILY HISTORY OF ISCHEMIC HEART DISEASE: ICD-10-CM

## 2024-08-02 DIAGNOSIS — Z12.31 VISIT FOR SCREENING MAMMOGRAM: ICD-10-CM

## 2024-08-02 DIAGNOSIS — Z78.0 ASYMPTOMATIC MENOPAUSAL STATE: ICD-10-CM

## 2024-08-02 DIAGNOSIS — E78.5 HYPERLIPIDEMIA LDL GOAL <100: ICD-10-CM

## 2024-08-02 PROCEDURE — 99214 OFFICE O/P EST MOD 30 MIN: CPT | Mod: 25 | Performed by: INTERNAL MEDICINE

## 2024-08-02 PROCEDURE — G0439 PPPS, SUBSEQ VISIT: HCPCS | Performed by: INTERNAL MEDICINE

## 2024-08-02 RX ORDER — LEVOTHYROXINE SODIUM 75 UG/1
75 TABLET ORAL DAILY
Qty: 90 TABLET | Refills: 3 | Status: SHIPPED | OUTPATIENT
Start: 2024-08-02

## 2024-08-02 SDOH — HEALTH STABILITY: PHYSICAL HEALTH: ON AVERAGE, HOW MANY DAYS PER WEEK DO YOU ENGAGE IN MODERATE TO STRENUOUS EXERCISE (LIKE A BRISK WALK)?: 2 DAYS

## 2024-08-02 ASSESSMENT — SOCIAL DETERMINANTS OF HEALTH (SDOH): HOW OFTEN DO YOU GET TOGETHER WITH FRIENDS OR RELATIVES?: ONCE A WEEK

## 2024-08-02 NOTE — PATIENT INSTRUCTIONS
Continue current medications  Prescriptions refilled at your pharmacy.    Call  [phone] or use [employer] to schedule a future lab appointment  non-fasting in 6 months for thyroid function.   Reduce calorie/carbohydrate (sugar, bread, potato, pasta, rice, alcohol etc)  intake in diet.  Increase color on your plate with vegetables. Increase  frequency of walking or other aerobic exercise as able (goal is daily)  Mammogram  now and DEXA (Bone density) test in  spring 2025   This will be done at the [facility]. Call [phone] or use [employer] to schedule.   CT Coronary calcium/Chest to assess for coronary artery disease.   Cora Mckay will call you to schedule. If you have not heard from their schedulers within 2 business days, then call [phone] (radiology scheduling)  Schedule an eye exam appointment in next 6 months. Please ask the eye clinic to fax us a report of your eye exam to [phone], attention Dr Montoya  Pt declines flu and covid vaccines  Pt was informed regarding extra E&M billing for management of new or established medical issues not related to today's wellness/screening visit

## 2024-08-02 NOTE — PROGRESS NOTES
Preventive Care Visit  [facility]  Roberto Montoya MD, Internal Medicine  Aug 2, 2024      ASSESSMENT:    1. Medicare annual wellness visit, subsequent  See plan discussion below regarding healthcare maintenance recommendations.  Otherwise up-to-date for age    2. Hypothyroidism, unspecified type  Controlled.  Continue current medication.  Repeat lab 6 months  - levothyroxine (SYNTHROID/LEVOTHROID) 75 MCG tablet; Take 1 tablet (75 mcg) by mouth daily  Dispense: 90 tablet; Refill: 3  - TSH with free T4 reflex; Future    3. Osteopenia, unspecified location  History osteopenia.  Menopausal.  Due for bone density follow-up.  Previously not to the point of requiring prescription therapy  - DX Bone Density; Future    4. Hyperlipidemia LDL goal <100  10-year CAD risk 16%.  Patient hesitant to start statin therapy.  Will get CT coronary calcium scan to risk stratify further.  If elevated Agaston score, will start statin therapy  - CT Coronary Calcium Scan; Future    5. Family history of ischemic heart disease  See #4  - CT Coronary Calcium Scan; Future    6. Asymptomatic menopausal state  See #3  - DX Bone Density; Future    7. Visit for screening mammogram  Asymptomatic.  Due for screening  - MA Screening Bilateral w/ Jose Rafael; Future      PLAN:  Continue current medications  Prescriptions refilled at your pharmacy.    Call  [phone] or use [facility] to schedule a future lab appointment  non-fasting in 6 months for thyroid function.   Reduce calorie/carbohydrate (sugar, bread, potato, pasta, rice, alcohol etc)  intake in diet.  Increase color on your plate with vegetables. Increase  frequency of walking or other aerobic exercise as able (goal is daily)  Mammogram  now and DEXA (Bone density) test in  spring 2025   This will be done at the [facility]. Call [phone] or use [facility] to schedule.   CT Coronary calcium/Chest to assess for coronary artery disease.   [facility] will call  you to schedule. If you have not heard from their schedulers within 2 business days, then call [phone] (radiology scheduling)  Schedule an eye exam appointment in next 6 months. Please ask the eye clinic to fax us a report of your eye exam to [phone], attention Dr Montoya  Pt declines flu and covid vaccines  Pt was informed regarding extra E&M billing for management of new or established medical issues not related to today's wellness/screening visit         Taylor Swanson is a 69 year old, presenting for the following:  Wellness Visit  And follow-up once daily regarding medical issues as above  Plan &      Via the Health Maintenance questionnaire, the patient has reported the following services have been completed -Mammogram: [facility] 2023-06-15, this information has been sent to the abstraction team.  Health Care Directive  Patient has a Health Care Directive on file  Advance care planning document is on file and is current.    HPI         8/2/2024   General Health   How would you rate your overall physical health? Good   Feel stress (tense, anxious, or unable to sleep) Only a little      (!) STRESS CONCERN      8/2/2024   Nutrition   Diet: Regular (no restrictions)            8/2/2024   Exercise   Days per week of moderate/strenous exercise 2 days      (!) EXERCISE CONCERN      8/2/2024   Social Factors   Frequency of gathering with friends or relatives Once a week   Worry food won't last until get money to buy more No   Food not last or not have enough money for food? No   Do you have housing? (Housing is defined as stable permanent housing and does not include staying ouside in a car, in a tent, in an abandoned building, in an overnight shelter, or couch-surfing.) Yes   Are you worried about losing your housing? No   Lack of transportation? No   Unable to get utilities (heat,electricity)? No            8/2/2024   Fall Risk   Fallen 2 or more times in the past year? No    No   Trouble with walking  or balance? No    No       Multiple values from one day are sorted in reverse-chronological order          8/2/2024   Activities of Daily Living- Home Safety   Needs help with the following daily activites None of the above   Safety concerns in the home No grab bars in the bathroom            8/2/2024   Dental   Dentist two times every year? Yes            8/2/2024   Hearing Screening   Hearing concerns? None of the above            8/2/2024   Driving Risk Screening   Patient/family members have concerns about driving No            8/2/2024   General Alertness/Fatigue Screening   Have you been more tired than usual lately? No            8/2/2024   Urinary Incontinence Screening   Bothered by leaking urine in past 6 months No            8/2/2024   TB Screening   Were you born outside of the [place]? No            Today's PHQ-2 Score:       8/2/2024    11:08 AM   PHQ-2 ( 1999 [employer])   Q1: Little interest or pleasure in doing things 0   Q2: Feeling down, depressed or hopeless 0   PHQ-2 Score 0   Q1: Little interest or pleasure in doing things Not at all   Q2: Feeling down, depressed or hopeless Not at all   PHQ-2 Score 0           8/2/2024   Substance Use   Alcohol more than 3/day or more than 7/wk Not Applicable   Do you have a current opioid prescription? No   How severe/bad is pain from 1 to 10? 0/10 (No Pain)   Do you use any other substances recreationally? No        Social History     Tobacco Use    Smoking status: Never    Smokeless tobacco: Never   Substance Use Topics    Alcohol use: Yes     Comment: occasional    Drug use: No            4/11/2023   LAST FHS-7 RESULTS   1st degree relative breast or ovarian cancer No   Any relative bilateral breast cancer No   Any male have breast cancer No   Any ONE woman have BOTH breast AND ovarian cancer No   Any woman with breast cancer before 50yrs No   2 or more relatives with breast AND/OR ovarian cancer No   2 or more relatives with breast AND/OR bowel cancer No            Mammogram Screening - Mammogram every 1-2 years updated in Health Maintenance based on mutual decision making      History of abnormal Pap smear: No - age 65 or older with adequate negative prior screening test results (3 consecutive negative cytology results, 2 consecutive negative cotesting results, or 2 consecutive negative HrHPV test results within 10 years, with the most recent test occurring within the recommended screening interval for the test used)        6/16/2014    12:00 AM 5/9/2011    12:00 AM 5/7/2010    12:00 AM   PAP / HPV   PAP (Historical) NIL  NIL  NIL      ASCVD Risk   The 10-year ASCVD risk score (Malena MEDRANO, et al., 2019) is: 16%    Values used to calculate the score:      Age: 69 years      Sex: Female      Is Non- : No      Diabetic: No      Tobacco smoker: No      Systolic Blood Pressure: 168 mmHg      Is BP treated: No      HDL Cholesterol: 76 mg/dL      Total Cholesterol: 266 mg/dL     DEXA January 2022. Repeat 3 years       Current providers sharing in care for this patient include:  Patient Care Team:  Roberto Montoya MD as PCP - General  Roberto Montoya MD as Assigned PCP    The following health maintenance items are reviewed in Epic and correct as of today:  Health Maintenance   Topic Date Due    RSV VACCINE (Pregnancy & 60+) (1 - 1-dose 60+ series) Never done    ANNUAL REVIEW OF HM ORDERS  01/14/2023    COVID-19 Vaccine (3 - 2023-24 season) 09/01/2023    MEDICARE ANNUAL WELLNESS VISIT  04/11/2024    MAMMO SCREENING  04/11/2024    INFLUENZA VACCINE (1) 09/01/2024    LIPID  07/29/2025    TSH W/FREE T4 REFLEX  07/29/2025    FALL RISK ASSESSMENT  08/02/2025    GLUCOSE  07/29/2027    COLORECTAL CANCER SCREENING  11/04/2027    ADVANCE CARE PLANNING  04/11/2028    DTAP/TDAP/TD IMMUNIZATION (3 - Td or Tdap) 11/17/2030    DEXA  01/28/2037    HEPATITIS C SCREENING  Completed    PHQ-2 (once per calendar year)  Completed    Pneumococcal Vaccine: 65+ Years  Completed  "   ZOSTER IMMUNIZATION  Completed    IPV IMMUNIZATION  Aged Out    HPV IMMUNIZATION  Aged Out    MENINGITIS IMMUNIZATION  Aged Out    RSV MONOCLONAL ANTIBODY  Aged Out         Review of Systems  CONSTITUTIONAL: NEGATIVE for fever, chills. Weight down 2 pounds  INTEGUMENTARY/SKIN: NEGATIVE for worrisome rashes, moles or lesions  EYES: NEGATIVE for vision changes or irritation  ENT/MOUTH: NEGATIVE for ear, mouth and throat problems  RESP: NEGATIVE for significant cough or SOB  BREAST: NEGATIVE for masses, tenderness or discharge  CV: NEGATIVE for chest pain, palpitations or peripheral edema  GI: NEGATIVE for nausea, abdominal pain, heartburn, or change in bowel habits  : NEGATIVE for frequency, dysuria, or hematuria  MUSCULOSKELETAL: NEGATIVE for significant arthralgias or myalgia  NEURO: NEGATIVE for weakness, dizziness or paresthesias  ENDOCRINE: NEGATIVE for temperature intolerance, skin/hair changes  HEME: NEGATIVE for bleeding problems  PSYCHIATRIC: NEGATIVE for changes in mood or affect     Objective    Exam  /76   Pulse 88   Temp 97.8  F (36.6  C) (Temporal)   Ht 1.6 m (5' 3\")   Wt 77.3 kg (170 lb 8 oz)   LMP 08/24/2004   SpO2 100%   BMI 30.20 kg/m     Estimated body mass index is 30.2 kg/m  as calculated from the following:    Height as of this encounter: 1.6 m (5' 3\").    Weight as of this encounter: 77.3 kg (170 lb 8 oz).    Physical Exam  General appearance - alert, no distress  Skin - No rashes or lesions.Multiple seborrheic keratosis on trunk. Skin tags on neck  Head - normocephalic, atraumatic  Eyes - TIM, EOMI, fundi exam with nondilated pupils negative.  Ears - External ears normal. Canals clear. TM's normal.  Nose/Sinuses - Nares normal. Septum midline. Mucosa normal. No drainage or sinus tenderness.  Oropharynx - No erythema, no adenopathy, no exudates.  Neck - Supple without adenopathy or thyromegaly. No bruits.  Lungs - Clear to auscultation without wheezes/rhonchi.  Heart - " Regular rate and rhythm without murmurs, clicks, or gallops.  Nodes - No supraclavicular, axillary, or inguinal adenopathy palpable.  Breasts - deferred  Abdomen - Abdomen mildly obese,  soft, non-tender. BS normal. No masses or hepatosplenomegaly palpable. No bruits.  Extremities -No cyanosis, clubbing or edema.    Musculoskeletal - Spine ROM normal. Muscular strength intact.   Peripheral pulses - radial=4/4, femoral=4/4, posterior tibial=4/4, dorsalis pedis=4/4,  Neuro - Gait normal. Reflexes normal and symmetric. Sensation grossly WNL.  Genital/Rectal - deferred           8/2/2024   Mini Cog   Clock Draw Score 2 Normal   3 Item Recall 3 objects recalled   Mini Cog Total Score 5           Signed Electronically by: Roberto Montoya MD

## 2024-08-07 ENCOUNTER — ANCILLARY PROCEDURE (OUTPATIENT)
Dept: MAMMOGRAPHY | Facility: CLINIC | Age: 69
End: 2024-08-07
Attending: INTERNAL MEDICINE
Payer: MEDICARE

## 2024-08-07 DIAGNOSIS — Z12.31 VISIT FOR SCREENING MAMMOGRAM: ICD-10-CM

## 2024-08-07 PROCEDURE — 77067 SCR MAMMO BI INCL CAD: CPT | Mod: TC | Performed by: RADIOLOGY

## 2024-08-07 PROCEDURE — 77063 BREAST TOMOSYNTHESIS BI: CPT | Mod: TC | Performed by: RADIOLOGY

## 2024-09-06 ENCOUNTER — HOSPITAL ENCOUNTER (OUTPATIENT)
Dept: CARDIOLOGY | Facility: CLINIC | Age: 69
Discharge: HOME | End: 2024-09-06
Attending: INTERNAL MEDICINE | Admitting: INTERNAL MEDICINE
Payer: MEDICARE

## 2024-09-06 DIAGNOSIS — Z82.49 FAMILY HISTORY OF ISCHEMIC HEART DISEASE: ICD-10-CM

## 2024-09-06 DIAGNOSIS — E78.5 HYPERLIPIDEMIA LDL GOAL <100: ICD-10-CM

## 2024-09-06 PROCEDURE — G1010 CDSM STANSON: HCPCS | Performed by: INTERNAL MEDICINE

## 2024-09-06 PROCEDURE — 75571 CT HRT W/O DYE W/CA TEST: CPT | Mod: 26 | Performed by: INTERNAL MEDICINE

## 2024-09-06 PROCEDURE — 75571 CT HRT W/O DYE W/CA TEST: CPT | Mod: ME

## 2024-12-11 ENCOUNTER — TRANSFERRED RECORDS (OUTPATIENT)
Dept: HEALTH INFORMATION MANAGEMENT | Facility: CLINIC | Age: 69
End: 2024-12-11
Payer: MEDICARE

## 2024-12-13 ENCOUNTER — TELEPHONE (OUTPATIENT)
Dept: INTERNAL MEDICINE | Facility: CLINIC | Age: 69
End: 2024-12-13
Payer: MEDICARE

## 2024-12-13 DIAGNOSIS — E03.9 HYPOTHYROIDISM, UNSPECIFIED TYPE: ICD-10-CM

## 2024-12-13 DIAGNOSIS — E78.5 HYPERLIPIDEMIA LDL GOAL <100: ICD-10-CM

## 2024-12-13 DIAGNOSIS — N18.31 STAGE 3A CHRONIC KIDNEY DISEASE (H): Primary | ICD-10-CM

## 2024-12-13 NOTE — TELEPHONE ENCOUNTER
Test Results    Contacts       Contact Date/Time Type Contact Phone/Fax    12/13/2024 01:59 PM CST Phone (Incoming) Kiki Ibrahim (Self) [phone] ()            Who ordered the test:  Dr. Montoya    Type of test: CT    Date of test:  9/7/24    Where was the test performed:  [facility]     What are your questions/concerns?:  Pt would like someone to call and go over results.     Okay to leave a detailed message?: Yes at Home number on file [phone] ([place])

## 2024-12-19 NOTE — TELEPHONE ENCOUNTER
Spoke with pt. Reviewed. CT results. Nonsmoker/low risk. Therefore no need for repeat imaging for small 3mm lung no duels and with Agaston score =-0, no gisela for statin and will reduce sat fats in diet and increase veggies and repeat lipids again next later summer when seen for annual exam (August or later). Future labs ordered for lipids, CKD3a and thyroid